# Patient Record
Sex: MALE | Race: WHITE | URBAN - METROPOLITAN AREA
[De-identification: names, ages, dates, MRNs, and addresses within clinical notes are randomized per-mention and may not be internally consistent; named-entity substitution may affect disease eponyms.]

---

## 2018-10-08 ENCOUNTER — HISTORICAL (OUTPATIENT)
Dept: ADMINISTRATIVE | Facility: HOSPITAL | Age: 78
End: 2018-10-08

## 2018-10-08 LAB
ABS NEUT (OLG): 4.82 X10(3)/MCL (ref 2.1–9.2)
BASOPHILS # BLD AUTO: 0 X10(3)/MCL (ref 0–0.2)
BASOPHILS NFR BLD AUTO: 0 %
CRP SERPL HS-MCNC: 0.35 MG/L (ref 0–3)
EOSINOPHIL # BLD AUTO: 0.3 X10(3)/MCL (ref 0–0.9)
EOSINOPHIL NFR BLD AUTO: 4 %
ERYTHROCYTE [DISTWIDTH] IN BLOOD BY AUTOMATED COUNT: 14 % (ref 11.5–17)
ERYTHROCYTE [SEDIMENTATION RATE] IN BLOOD: 8 MM/HR (ref 0–15)
HCT VFR BLD AUTO: 39.7 % (ref 42–52)
HGB BLD-MCNC: 12.7 GM/DL (ref 14–18)
LYMPHOCYTES # BLD AUTO: 1.7 X10(3)/MCL (ref 0.6–4.6)
LYMPHOCYTES NFR BLD AUTO: 22 %
MCH RBC QN AUTO: 29.4 PG (ref 27–31)
MCHC RBC AUTO-ENTMCNC: 32 GM/DL (ref 33–36)
MCV RBC AUTO: 91.9 FL (ref 80–94)
MONOCYTES # BLD AUTO: 0.9 X10(3)/MCL (ref 0.1–1.3)
MONOCYTES NFR BLD AUTO: 12 %
NEUTROPHILS # BLD AUTO: 4.82 X10(3)/MCL (ref 2.1–9.2)
NEUTROPHILS NFR BLD AUTO: 61 %
PLATELET # BLD AUTO: 200 X10(3)/MCL (ref 130–400)
PMV BLD AUTO: 9.9 FL (ref 9.4–12.4)
RBC # BLD AUTO: 4.32 X10(6)/MCL (ref 4.7–6.1)
WBC # SPEC AUTO: 7.8 X10(3)/MCL (ref 4.5–11.5)

## 2018-10-15 ENCOUNTER — HISTORICAL (OUTPATIENT)
Dept: ADMINISTRATIVE | Facility: HOSPITAL | Age: 78
End: 2018-10-15

## 2018-11-13 ENCOUNTER — HISTORICAL (OUTPATIENT)
Dept: ADMINISTRATIVE | Facility: HOSPITAL | Age: 78
End: 2018-11-13

## 2018-11-13 LAB
ABS NEUT (OLG): 6.1 X10(3)/MCL (ref 2.1–9.2)
ALBUMIN SERPL-MCNC: 3.9 GM/DL (ref 3.4–5)
ALBUMIN/GLOB SERPL: 1.3 RATIO (ref 1.1–2)
ALP SERPL-CCNC: 78 UNIT/L (ref 50–136)
ALT SERPL-CCNC: 20 UNIT/L (ref 12–78)
APPEARANCE, UA: CLEAR
APTT PPP: 29.8 SECOND(S) (ref 24.8–36.9)
AST SERPL-CCNC: 20 UNIT/L (ref 15–37)
BACTERIA SPEC CULT: NORMAL /HPF
BASOPHILS # BLD AUTO: 0 X10(3)/MCL (ref 0–0.2)
BASOPHILS NFR BLD AUTO: 0 %
BILIRUB SERPL-MCNC: 0.5 MG/DL (ref 0.2–1)
BILIRUB UR QL STRIP: NEGATIVE
BILIRUBIN DIRECT+TOT PNL SERPL-MCNC: 0.1 MG/DL (ref 0–0.5)
BILIRUBIN DIRECT+TOT PNL SERPL-MCNC: 0.4 MG/DL (ref 0–0.8)
BUN SERPL-MCNC: 10 MG/DL (ref 7–18)
CALCIUM SERPL-MCNC: 9 MG/DL (ref 8.5–10.1)
CHLORIDE SERPL-SCNC: 105 MMOL/L (ref 98–107)
CO2 SERPL-SCNC: 31 MMOL/L (ref 21–32)
COLOR UR: YELLOW
CREAT SERPL-MCNC: 0.78 MG/DL (ref 0.7–1.3)
EOSINOPHIL # BLD AUTO: 0.3 X10(3)/MCL (ref 0–0.9)
EOSINOPHIL NFR BLD AUTO: 3 %
ERYTHROCYTE [DISTWIDTH] IN BLOOD BY AUTOMATED COUNT: 14.2 % (ref 11.5–17)
EST. AVERAGE GLUCOSE BLD GHB EST-MCNC: 126 MG/DL
GLOBULIN SER-MCNC: 2.9 GM/DL (ref 2.4–3.5)
GLUCOSE (UA): NEGATIVE
GLUCOSE SERPL-MCNC: 98 MG/DL (ref 74–106)
HBA1C MFR BLD: 6 % (ref 4.2–6.3)
HCT VFR BLD AUTO: 42.5 % (ref 42–52)
HGB BLD-MCNC: 13.4 GM/DL (ref 14–18)
HGB UR QL STRIP: NEGATIVE
INR PPP: 1.04 (ref 0–1.27)
KETONES UR QL STRIP: NEGATIVE
LEUKOCYTE ESTERASE UR QL STRIP: NEGATIVE
LYMPHOCYTES # BLD AUTO: 2 X10(3)/MCL (ref 0.6–4.6)
LYMPHOCYTES NFR BLD AUTO: 22 %
MCH RBC QN AUTO: 28.8 PG (ref 27–31)
MCHC RBC AUTO-ENTMCNC: 31.5 GM/DL (ref 33–36)
MCV RBC AUTO: 91.2 FL (ref 80–94)
MONOCYTES # BLD AUTO: 1 X10(3)/MCL (ref 0.1–1.3)
MONOCYTES NFR BLD AUTO: 11 %
MRSA SCREEN BY PCR: NEGATIVE
NEUTROPHILS # BLD AUTO: 6.1 X10(3)/MCL (ref 2.1–9.2)
NEUTROPHILS NFR BLD AUTO: 64 %
NITRITE UR QL STRIP: NEGATIVE
PH UR STRIP: 7 [PH] (ref 5–9)
PLATELET # BLD AUTO: 205 X10(3)/MCL (ref 130–400)
PMV BLD AUTO: 10.5 FL (ref 9.4–12.4)
POTASSIUM SERPL-SCNC: 4.4 MMOL/L (ref 3.5–5.1)
PROT SERPL-MCNC: 6.8 GM/DL (ref 6.4–8.2)
PROT UR QL STRIP: NEGATIVE
PROTHROMBIN TIME: 13.9 SECOND(S) (ref 12.2–14.7)
RBC # BLD AUTO: 4.66 X10(6)/MCL (ref 4.7–6.1)
RBC #/AREA URNS HPF: NORMAL /[HPF]
SODIUM SERPL-SCNC: 141 MMOL/L (ref 136–145)
SP GR UR STRIP: 1.02 (ref 1–1.03)
SQUAMOUS EPITHELIAL, UA: NORMAL
TRANSFERRIN SERPL-MCNC: 279 MG/DL (ref 200–360)
UROBILINOGEN UR STRIP-ACNC: 0.2
WBC # SPEC AUTO: 9.5 X10(3)/MCL (ref 4.5–11.5)
WBC #/AREA URNS HPF: NORMAL /HPF

## 2018-11-14 LAB
ABS NEUT (OLG): 13.25 X10(3)/MCL (ref 2.1–9.2)
BASOPHILS # BLD AUTO: 0 X10(3)/MCL (ref 0–0.2)
BASOPHILS NFR BLD AUTO: 0 %
BUN SERPL-MCNC: 12 MG/DL (ref 7–18)
CALCIUM SERPL-MCNC: 7.8 MG/DL (ref 8.5–10.1)
CHLORIDE SERPL-SCNC: 107 MMOL/L (ref 98–107)
CO2 SERPL-SCNC: 27 MMOL/L (ref 21–32)
CREAT SERPL-MCNC: 0.77 MG/DL (ref 0.7–1.3)
CREAT/UREA NIT SERPL: 15.6
ERYTHROCYTE [DISTWIDTH] IN BLOOD BY AUTOMATED COUNT: 14.3 % (ref 11.5–17)
GLUCOSE SERPL-MCNC: 105 MG/DL (ref 74–106)
HCT VFR BLD AUTO: 36.1 % (ref 42–52)
HGB BLD-MCNC: 11.2 GM/DL (ref 14–18)
LYMPHOCYTES # BLD AUTO: 0.9 X10(3)/MCL (ref 0.6–4.6)
LYMPHOCYTES NFR BLD AUTO: 6 %
MCH RBC QN AUTO: 28.9 PG (ref 27–31)
MCHC RBC AUTO-ENTMCNC: 31 GM/DL (ref 33–36)
MCV RBC AUTO: 93 FL (ref 80–94)
MONOCYTES # BLD AUTO: 1.2 X10(3)/MCL (ref 0.1–1.3)
MONOCYTES NFR BLD AUTO: 8 %
NEUTROPHILS # BLD AUTO: 13.25 X10(3)/MCL (ref 2.1–9.2)
NEUTROPHILS NFR BLD AUTO: 86 %
PLATELET # BLD AUTO: 176 X10(3)/MCL (ref 130–400)
PMV BLD AUTO: 10.3 FL (ref 9.4–12.4)
POTASSIUM SERPL-SCNC: 4.5 MMOL/L (ref 3.5–5.1)
RBC # BLD AUTO: 3.88 X10(6)/MCL (ref 4.7–6.1)
SODIUM SERPL-SCNC: 143 MMOL/L (ref 136–145)
WBC # SPEC AUTO: 15.5 X10(3)/MCL (ref 4.5–11.5)

## 2018-11-15 LAB
ABS NEUT (OLG): 6.4 X10(3)/MCL (ref 2.1–9.2)
BASOPHILS # BLD AUTO: 0 X10(3)/MCL (ref 0–0.2)
BASOPHILS NFR BLD AUTO: 0 %
BUN SERPL-MCNC: 14 MG/DL (ref 7–18)
CALCIUM SERPL-MCNC: 7.4 MG/DL (ref 8.5–10.1)
CHLORIDE SERPL-SCNC: 110 MMOL/L (ref 98–107)
CO2 SERPL-SCNC: 27 MMOL/L (ref 21–32)
CREAT SERPL-MCNC: 0.7 MG/DL (ref 0.7–1.3)
CREAT/UREA NIT SERPL: 20
EOSINOPHIL # BLD AUTO: 0.1 X10(3)/MCL (ref 0–0.9)
EOSINOPHIL NFR BLD AUTO: 1 %
ERYTHROCYTE [DISTWIDTH] IN BLOOD BY AUTOMATED COUNT: 14.4 % (ref 11.5–17)
GLUCOSE SERPL-MCNC: 99 MG/DL (ref 74–106)
HCT VFR BLD AUTO: 33.9 % (ref 42–52)
HGB BLD-MCNC: 10.4 GM/DL (ref 14–18)
LYMPHOCYTES # BLD AUTO: 1.4 X10(3)/MCL (ref 0.6–4.6)
LYMPHOCYTES NFR BLD AUTO: 16 %
MCH RBC QN AUTO: 29.1 PG (ref 27–31)
MCHC RBC AUTO-ENTMCNC: 30.7 GM/DL (ref 33–36)
MCV RBC AUTO: 95 FL (ref 80–94)
MONOCYTES # BLD AUTO: 1.2 X10(3)/MCL (ref 0.1–1.3)
MONOCYTES NFR BLD AUTO: 13 %
NEUTROPHILS # BLD AUTO: 6.4 X10(3)/MCL (ref 2.1–9.2)
NEUTROPHILS NFR BLD AUTO: 69 %
PLATELET # BLD AUTO: 147 X10(3)/MCL (ref 130–400)
PMV BLD AUTO: 10.5 FL (ref 9.4–12.4)
POTASSIUM SERPL-SCNC: 4.3 MMOL/L (ref 3.5–5.1)
RBC # BLD AUTO: 3.57 X10(6)/MCL (ref 4.7–6.1)
SODIUM SERPL-SCNC: 143 MMOL/L (ref 136–145)
WBC # SPEC AUTO: 9.3 X10(3)/MCL (ref 4.5–11.5)

## 2018-12-12 ENCOUNTER — HISTORICAL (OUTPATIENT)
Dept: ADMINISTRATIVE | Facility: HOSPITAL | Age: 78
End: 2018-12-12

## 2018-12-12 LAB
ABS NEUT (OLG): 5.41 X10(3)/MCL (ref 2.1–9.2)
ALBUMIN SERPL-MCNC: 3.5 GM/DL (ref 3.4–5)
ALBUMIN/GLOB SERPL: 1 {RATIO}
ALP SERPL-CCNC: 108 UNIT/L (ref 45–117)
ALT SERPL-CCNC: 14 UNIT/L (ref 16–61)
APPEARANCE, UA: CLEAR
APTT PPP: 30.8 SECOND(S) (ref 24.8–36.9)
AST SERPL-CCNC: 13 UNIT/L (ref 15–37)
BILIRUB SERPL-MCNC: 0.3 MG/DL (ref 0.2–1)
BILIRUB UR QL STRIP: NEGATIVE
BILIRUBIN DIRECT+TOT PNL SERPL-MCNC: 0.1 MG/DL (ref 0–0.2)
BILIRUBIN DIRECT+TOT PNL SERPL-MCNC: 0.2 MG/DL (ref 0–1)
BUN SERPL-MCNC: 9 MG/DL (ref 7–18)
CALCIUM SERPL-MCNC: 8.2 MG/DL (ref 8.5–10.1)
CHLORIDE SERPL-SCNC: 105 MMOL/L (ref 98–107)
CO2 SERPL-SCNC: 30 MMOL/L (ref 21–32)
COLOR UR: YELLOW
CREAT SERPL-MCNC: 0.85 MG/DL (ref 0.7–1.3)
ERYTHROCYTE [DISTWIDTH] IN BLOOD BY AUTOMATED COUNT: 13.6 % (ref 11.5–17)
EST. AVERAGE GLUCOSE BLD GHB EST-MCNC: 128 MG/DL
GLOBULIN SER-MCNC: 3 GM/DL (ref 2–4)
GLUCOSE (UA): NEGATIVE
GLUCOSE SERPL-MCNC: 146 MG/DL (ref 74–106)
HBA1C MFR BLD: 6.1 % (ref 4.2–6.3)
HCT VFR BLD AUTO: 37 % (ref 42–52)
HGB BLD-MCNC: 12 GM/DL (ref 14–18)
HGB UR QL STRIP: NEGATIVE
INR PPP: 1.1 (ref 0–1.3)
KETONES UR QL STRIP: NEGATIVE
LEUKOCYTE ESTERASE UR QL STRIP: NEGATIVE
MCH RBC QN AUTO: 29.5 PG (ref 27–31)
MCHC RBC AUTO-ENTMCNC: 32.4 GM/DL (ref 33–36)
MCV RBC AUTO: 90.9 FL (ref 80–94)
NITRITE UR QL STRIP: NEGATIVE
PH UR STRIP: 6.5 [PH] (ref 5–7)
PLATELET # BLD AUTO: 180 X10(3)/MCL (ref 130–400)
PMV BLD AUTO: 10.2 FL (ref 7.4–10.4)
POTASSIUM SERPL-SCNC: 3.8 MMOL/L (ref 3.5–5.1)
PROT SERPL-MCNC: 6.9 GM/DL (ref 6.4–8.2)
PROT UR QL STRIP: NEGATIVE
PROTHROMBIN TIME: 14.7 SECOND(S) (ref 12.2–14.7)
RBC # BLD AUTO: 4.07 X10(6)/MCL (ref 4.7–6.1)
SODIUM SERPL-SCNC: 143 MMOL/L (ref 136–145)
SP GR UR STRIP: 1.02 (ref 1–1.03)
TRANSFERRIN SERPL-MCNC: 232 MG/DL (ref 200–360)
UROBILINOGEN UR STRIP-ACNC: NEGATIVE
WBC # SPEC AUTO: 8 X10(3)/MCL (ref 4.5–11.5)

## 2018-12-14 LAB — FINAL CULTURE: NORMAL

## 2018-12-26 ENCOUNTER — HISTORICAL (OUTPATIENT)
Dept: ADMINISTRATIVE | Facility: HOSPITAL | Age: 78
End: 2018-12-26

## 2019-02-06 ENCOUNTER — HISTORICAL (OUTPATIENT)
Dept: ADMINISTRATIVE | Facility: HOSPITAL | Age: 79
End: 2019-02-06

## 2019-05-08 ENCOUNTER — HISTORICAL (OUTPATIENT)
Dept: ADMINISTRATIVE | Facility: HOSPITAL | Age: 79
End: 2019-05-08

## 2019-05-14 ENCOUNTER — HISTORICAL (OUTPATIENT)
Dept: ADMINISTRATIVE | Facility: HOSPITAL | Age: 79
End: 2019-05-14

## 2019-05-14 LAB
ABS NEUT (OLG): 7.16 X10(3)/MCL (ref 2.1–9.2)
ALBUMIN SERPL-MCNC: 3.5 GM/DL (ref 3.4–5)
ALBUMIN/GLOB SERPL: 1.2 {RATIO}
ALP SERPL-CCNC: 67 UNIT/L (ref 45–117)
ALT SERPL-CCNC: 22 UNIT/L (ref 16–61)
APPEARANCE, UA: CLEAR
APTT PPP: 26.5 SEC (ref 23.4–34.9)
AST SERPL-CCNC: 11 UNIT/L (ref 15–37)
BILIRUB SERPL-MCNC: 0.4 MG/DL (ref 0.2–1)
BILIRUB UR QL STRIP: NEGATIVE
BILIRUBIN DIRECT+TOT PNL SERPL-MCNC: 0.12 MG/DL (ref 0–0.2)
BILIRUBIN DIRECT+TOT PNL SERPL-MCNC: 0.28 MG/DL (ref 0–1)
BUN SERPL-MCNC: 16 MG/DL (ref 7–18)
CALCIUM SERPL-MCNC: 7.7 MG/DL (ref 8.5–10.1)
CHLORIDE SERPL-SCNC: 105 MMOL/L (ref 98–107)
CO2 SERPL-SCNC: 30 MMOL/L (ref 21–32)
COLOR UR: NORMAL
CREAT SERPL-MCNC: 0.88 MG/DL (ref 0.7–1.3)
ERYTHROCYTE [DISTWIDTH] IN BLOOD BY AUTOMATED COUNT: 15.4 % (ref 11.5–17)
EST. AVERAGE GLUCOSE BLD GHB EST-MCNC: 128 MG/DL
GLOBULIN SER-MCNC: 3 GM/DL (ref 2–4)
GLUCOSE (UA): NEGATIVE
GLUCOSE SERPL-MCNC: 82 MG/DL (ref 74–106)
HBA1C MFR BLD: 6.1 % (ref 4.2–6.3)
HCT VFR BLD AUTO: 41.8 % (ref 42–52)
HGB BLD-MCNC: 13.5 GM/DL (ref 14–18)
HGB UR QL STRIP: NEGATIVE
INR PPP: 1.1 (ref 2–3)
KETONES UR QL STRIP: NEGATIVE
LEUKOCYTE ESTERASE UR QL STRIP: NEGATIVE
MCH RBC QN AUTO: 28.6 PG (ref 27–31)
MCHC RBC AUTO-ENTMCNC: 32.3 GM/DL (ref 33–36)
MCV RBC AUTO: 88.6 FL (ref 80–94)
NITRITE UR QL STRIP: NEGATIVE
PH UR STRIP: 7 [PH] (ref 5–7)
PLATELET # BLD AUTO: 223 X10(3)/MCL (ref 130–400)
PMV BLD AUTO: 10.6 FL (ref 7.4–10.4)
POTASSIUM SERPL-SCNC: 3.9 MMOL/L (ref 3.5–5.1)
PROT SERPL-MCNC: 6.3 GM/DL (ref 6.4–8.2)
PROT UR QL STRIP: NEGATIVE
PROTHROMBIN TIME: 13.9 SEC (ref 11.7–14.5)
RBC # BLD AUTO: 4.72 X10(6)/MCL (ref 4.7–6.1)
SODIUM SERPL-SCNC: 142 MMOL/L (ref 136–145)
SP GR UR STRIP: 1.01 (ref 1–1.03)
TRANSFERRIN SERPL-MCNC: 236 MG/DL (ref 200–360)
UROBILINOGEN UR STRIP-ACNC: NEGATIVE
WBC # SPEC AUTO: 11.2 X10(3)/MCL (ref 4.5–11.5)

## 2019-05-16 LAB — FINAL CULTURE: NORMAL

## 2019-05-22 ENCOUNTER — HISTORICAL (OUTPATIENT)
Dept: ADMINISTRATIVE | Facility: HOSPITAL | Age: 79
End: 2019-05-22

## 2019-06-03 LAB
HCT VFR BLD AUTO: 40.8 % (ref 42–52)
HGB BLD-MCNC: 12.8 GM/DL (ref 14–18)

## 2019-06-04 LAB
ABS NEUT (OLG): 11.49 X10(3)/MCL (ref 2.1–9.2)
BUN SERPL-MCNC: 11 MG/DL (ref 7–18)
CALCIUM SERPL-MCNC: 7.8 MG/DL (ref 8.5–10.1)
CHLORIDE SERPL-SCNC: 107 MMOL/L (ref 98–107)
CO2 SERPL-SCNC: 29 MMOL/L (ref 21–32)
CREAT SERPL-MCNC: 0.83 MG/DL (ref 0.7–1.3)
CREAT/UREA NIT SERPL: 13
ERYTHROCYTE [DISTWIDTH] IN BLOOD BY AUTOMATED COUNT: 14.8 % (ref 11.5–17)
GLUCOSE SERPL-MCNC: 131 MG/DL (ref 74–106)
HCT VFR BLD AUTO: 35.2 % (ref 42–52)
HGB BLD-MCNC: 11.2 GM/DL (ref 14–18)
MCH RBC QN AUTO: 29.1 PG (ref 27–31)
MCHC RBC AUTO-ENTMCNC: 31.8 GM/DL (ref 33–36)
MCV RBC AUTO: 91.4 FL (ref 80–94)
PLATELET # BLD AUTO: 176 X10(3)/MCL (ref 130–400)
PMV BLD AUTO: 9.9 FL (ref 7.4–10.4)
POTASSIUM SERPL-SCNC: 4.4 MMOL/L (ref 3.5–5.1)
RBC # BLD AUTO: 3.85 X10(6)/MCL (ref 4.7–6.1)
SODIUM SERPL-SCNC: 141 MMOL/L (ref 136–145)
WBC # SPEC AUTO: 13.8 X10(3)/MCL (ref 4.5–11.5)

## 2019-06-05 LAB
ABS NEUT (OLG): 12.08 X10(3)/MCL (ref 2.1–9.2)
BUN SERPL-MCNC: 13 MG/DL (ref 7–18)
CALCIUM SERPL-MCNC: 8.5 MG/DL (ref 8.5–10.1)
CHLORIDE SERPL-SCNC: 109 MMOL/L (ref 98–107)
CO2 SERPL-SCNC: 30 MMOL/L (ref 21–32)
CREAT SERPL-MCNC: 0.77 MG/DL (ref 0.7–1.3)
CREAT/UREA NIT SERPL: 17
ERYTHROCYTE [DISTWIDTH] IN BLOOD BY AUTOMATED COUNT: 14.6 % (ref 11.5–17)
GLUCOSE SERPL-MCNC: 115 MG/DL (ref 74–106)
HCT VFR BLD AUTO: 30.8 % (ref 42–52)
HGB BLD-MCNC: 9.7 GM/DL (ref 14–18)
MCH RBC QN AUTO: 28.8 PG (ref 27–31)
MCHC RBC AUTO-ENTMCNC: 31.5 GM/DL (ref 33–36)
MCV RBC AUTO: 91.4 FL (ref 80–94)
PLATELET # BLD AUTO: 155 X10(3)/MCL (ref 130–400)
PMV BLD AUTO: 10.1 FL (ref 7.4–10.4)
POTASSIUM SERPL-SCNC: 4.2 MMOL/L (ref 3.5–5.1)
RBC # BLD AUTO: 3.37 X10(6)/MCL (ref 4.7–6.1)
SODIUM SERPL-SCNC: 142 MMOL/L (ref 136–145)
WBC # SPEC AUTO: 14.6 X10(3)/MCL (ref 4.5–11.5)

## 2019-06-18 ENCOUNTER — HISTORICAL (OUTPATIENT)
Dept: ADMINISTRATIVE | Facility: HOSPITAL | Age: 79
End: 2019-06-18

## 2019-08-19 ENCOUNTER — HISTORICAL (OUTPATIENT)
Dept: ADMINISTRATIVE | Facility: HOSPITAL | Age: 79
End: 2019-08-19

## 2019-11-13 ENCOUNTER — HISTORICAL (OUTPATIENT)
Dept: ADMINISTRATIVE | Facility: HOSPITAL | Age: 79
End: 2019-11-13

## 2019-11-24 ENCOUNTER — HISTORICAL (OUTPATIENT)
Dept: ADMINISTRATIVE | Facility: HOSPITAL | Age: 79
End: 2019-11-24

## 2020-02-04 ENCOUNTER — HISTORICAL (OUTPATIENT)
Dept: ADMINISTRATIVE | Facility: HOSPITAL | Age: 80
End: 2020-02-04

## 2020-03-11 ENCOUNTER — HISTORICAL (OUTPATIENT)
Dept: ADMINISTRATIVE | Facility: HOSPITAL | Age: 80
End: 2020-03-11

## 2020-04-01 ENCOUNTER — HISTORICAL (OUTPATIENT)
Dept: ADMINISTRATIVE | Facility: HOSPITAL | Age: 80
End: 2020-04-01

## 2020-04-03 ENCOUNTER — HISTORICAL (OUTPATIENT)
Dept: ADMINISTRATIVE | Facility: HOSPITAL | Age: 80
End: 2020-04-03

## 2022-09-13 NOTE — HISTORICAL [facility]
This is a historical note converted from Christopher. Formatting and pictures may have been removed.  Please reference Christopher for original formatting and attached multimedia. Chief Complaint  12 month s/p Right Rev TSA, SX 11/14/18. ?Pt states his ROM is still not were it need to be, with min pain. ?Pain today is from the cold weather.  History of Present Illness  11/14/2018: Revision total shoulder arthroplasty to reverse shoulder arthroplasty  ?   He returns today. His pains under good control.? Thus far is happy with his results  Review of Systems  Comprehensive review of system?was performed with no exceptions other than noted in the history of present illness [1]  Physical Exam  Vitals & Measurements  BP:?130/70?  HT:?175?cm? WT:?96.8?kg? BMI:?31.61?  Gen: WN, WD, NAD  Card/Res: NL breathing, +distal pulses  Abdomen: ND  Shoulder Exam:??????????Right??????????Left  Skin:??????????????????????????????Normal???????Normal  AC joint tenderness:??????????None??????????None  Forward Flexion:?????????????160 ??????????160  Abduction:?????????????????????110??????????? 180  External Rotation:??????????????40??????????????80  Internal Rotation?????????????? 80 ???????????? 80  ?   N-V status:?????????????????????????Intact??????????Intact  ?   C-spine: Normal ROM, NT  ?  DASH:  2/6/2019: 50  11/13/2019: 19  Assessment/Plan  1.?S/p reverse total shoulder arthroplasty?Z96.619  Doing well status post above. ?I will see him back next year with radiographs the right shoulder  Ordered:  Clinic Follow up, *Est. 11/13/20 3:00:00 CST, Order for future visit, S/p reverse total shoulder arthroplasty, [facility]  Office/Outpatient Visit Level 3 Established [id number] PC, S/p reverse total shoulder arthroplasty, [facility], 11/13/19 11:01:00 CST  ?  Orders:  XR Shoulder Right Minimum 2 Views, Routine, 11/13/19 10:22:00 CST, Pain, None, Ambulatory, Patient Has IV?, Rad Type, Right shoulder pain, Not Scheduled, 11/13/19 10:22:00  CST  Referrals  Clinic Follow up, *Est. 11/13/20 3:00:00 CST, Order for future visit, S/p reverse total shoulder arthroplasty, [facility]   Problem List/Past Medical History  Ongoing  Acute myocardial infarction  Asthma  CAD - Coronary artery disease  Diabetes mellitus  Failed total joint replacement  Glasses  H/O shoulder replacement  Hyperlipidemia  Hypertension  Obesity  Rotator cuff tear  S/p reverse total shoulder arthroplasty  Historical  No qualifying data  Procedure/Surgical History  FLAVIA CROFT Total Knee Arthroplasty (Right) (06/03/2019)  Replacement of Right Knee Joint with Synthetic Substitute, Open Approach (06/03/2019)  Robotic Assisted Procedure of Lower Extremity, Open Approach (06/03/2019)  Removal of Synthetic Substitute from Right Shoulder Joint, Open Approach (11/13/2018)  Replacement of Right Shoulder Joint with Reverse Ball and Socket Synthetic Substitute, Open Approach (11/13/2018)  Total Reverse Shoulder (Right) (11/13/2018)  Stent placement (2017)  Appendectomy  Shoulder replacement   Medications  aspirin 325 mg oral Delayed Release (EC) tablet, 325 mg= 1 tab(s), Oral, Daily  ATORVASTATIN 80 MG TABLET, 1 tab, Oral, At Bedtime  CeleBREX, 200 mg= 1 cap(s), Oral, Once  Colace 100 mg oral capsule, 200 mg= 2 cap(s), Oral, Daily  gabapentin 300 mg oral capsule, 300 mg= 1 cap(s), Oral, Once  LATANOPROST 0 005 SOLN, 1 drop, Daily  LISINOPRIL 20 MG TAB, 1 tab, Oral, Daily  METFORMIN 500MG TAB, 1 tab, Oral, BID  NITROGLYCERIN 0 4 MG TAB, 1 tab, SL, q5min, PRN  omeprazole, 20 mg, Oral, Daily, PRN,? ?Still taking, not as prescribed: Takes only prn- OTC Last Dose Date/Time Unknown  Tylenol, 500 mg= 1 tab(s), Oral, q4hr,? ?Not taking: Last Dose Date/Time Unknown  Tylenol, 1000 mg= 2 tab(s), Oral, Once  VENTOLIN HFA 90 MCG INHALER, 2 puff, INH, BID,? ?Not taking: Last Dose Date/Time Unknown Last Dose Date/Time Unknown  Zofran ODT 4 mg oral tablet, disintegrating, 4 mg= 1 tab(s), Oral, q6hr, PRN, 1  refills  Allergies  No Known Medication Allergies  Social History  Abuse/Neglect  No, 11/13/2019  Alcohol  Never, 10/08/2018  Employment/School  Retired, 05/14/2019  Exercise  Home/Environment  Lives with Spouse., 10/08/2018  Nutrition/Health  Regular, 12/12/2018  Tobacco  Former smoker, quit more than 30 days ago, No, 11/13/2019  Family History  Asthma.: Sister.  Health Maintenance  Health Maintenance  ???Pending?(in the next year)  ??? ??OverDue  ??? ? ? ?Coronary Artery Disease Maintenance-Antiplatelet Agent Prescribed due??and every?  ??? ? ? ?Coronary Artery Disease Maintenance-Lipid Lowering Therapy due??and every?  ??? ? ? ?Advance Directive due??01/01/19??and every 1??year(s)  ??? ? ? ?Geriatric Depression Screening due??01/01/19??and every 1??year(s)  ??? ??Due?  ??? ? ? ?ADL Screening due??11/13/19??and every 1??year(s)  ??? ? ? ?Asthma Management-Asthma Education due??11/13/19??and every 6??month(s)  ??? ? ? ?Asthma Management-Ornelas Peak Flow due??11/13/19??Variable frequency  ??? ? ? ?Asthma Management-Written Action Plan due??11/13/19??and every 6??month(s)  ??? ? ? ?Diabetes Maintenance-Eye Exam due??11/13/19??and every?  ??? ? ? ?Diabetes Maintenance-Foot Exam due??11/13/19??and every?  ??? ? ? ?Diabetes Maintenance-Fasting Lipid Profile due??11/13/19??and every?  ??? ? ? ?Hypertension Management-Education due??11/13/19??and every 1??year(s)  ??? ? ? ?Pneumococcal Vaccine due??11/13/19??Variable frequency  ??? ? ? ?Pneumococcal Vaccine due??11/13/19??and every?  ??? ? ? ?Tetanus Vaccine due??11/13/19??and every 10??year(s)  ??? ? ? ?Zoster Vaccine due??11/13/19??and every 100??year(s)  ??? ??Refused?  ??? ? ? ?Cognitive Screening due??01/01/19??and every 1??year(s)  ??? ??Due In Future?  ??? ? ? ?Fall Risk Assessment not due until??01/01/20??and every 1??year(s)  ??? ? ? ?Functional Assessment not due until??01/01/20??and every 1??year(s)  ??? ? ? ?Obesity Screening not due until??01/01/20??and every  1??year(s)  ??? ? ? ?Diabetes Maintenance-HgbA1c not due until??05/13/20??and every 1??year(s)  ??? ? ? ?Diabetes Maintenance-Urine Dipstick not due until??05/14/20??and every 1??year(s)  ??? ? ? ?Hypertension Management-BMP not due until??06/04/20??and every 1??year(s)  ??? ? ? ?Coronary Artery Disease Maintenance-BMP not due until??06/05/20??and every 1??year(s)  ??? ? ? ?Diabetes Maintenance-Serum Creatinine not due until??06/05/20??and every 1??year(s)  ??? ? ? ?Aspirin Therapy for CVD Prevention not due until??06/06/20??and every 1??year(s)  ??? ? ? ?Asthma Management-Spirometry not due until??06/06/20??and every 1??year(s)  ??? ? ? ?Coronary Artery Disease Maintenance-Electrocardiogram not due until??06/06/20??and every 1??year(s)  ??? ? ? ?Hypertension Management-Blood Pressure not due until??07/17/20??and every 1??year(s)  ???Satisfied?(in the past 1 year)  ??? ??Satisfied?  ??? ? ? ?Aspirin Therapy for CVD Prevention on??06/06/19.??Satisfied by Paz Moya NP  ??? ? ? ?Asthma Management-Asthma Medication Prescribed on??06/04/19.??Satisfied by Paz Moya NP  ??? ? ? ?Asthma Management-Spirometry on??06/06/19.??Satisfied by Gisel Mccall RN  ??? ? ? ?Blood Pressure Screening on??11/13/19.??Satisfied by Brooklyn Estes  ??? ? ? ?Body Mass Index Check on??11/13/19.??Satisfied by Brooklyn Estes  ??? ? ? ?Coronary Artery Disease Maintenance-Electrocardiogram on??06/06/19.  ??? ? ? ?Coronary Artery Disease Maintenance-Antiplatelet Agent Prescribed on??06/06/19.??Satisfied by Nita PARKER, Paz SÁNCHEZ  ??? ? ? ?Coronary Artery Disease Maintenance-Lipid Lowering Therapy on??06/05/19.??Satisfied by Dayo ADAMS, Yung RAMOS  ??? ? ? ?Coronary Artery Disease Maintenance-BMP on??06/05/19.??Satisfied by Gal Izaguirre  ??? ? ? ?Depression Screening on??05/08/19.??Satisfied by Tonja Barnes  ??? ? ? ?Diabetes Maintenance-HgbA1c on??05/14/19.??Satisfied by Tea Amador  ??? ? ? ?Diabetes  Maintenance-Serum Creatinine on??06/05/19.??Satisfied by Gal Izaguirre  ??? ? ? ?Diabetes Maintenance-Urine Dipstick on??05/14/19.??Satisfied by Gal Izaguirre  ??? ? ? ?Diabetes Screening on??06/05/19.??Satisfied by Gal Izaguirre  ??? ? ? ?Fall Risk Assessment on??06/06/19.??Satisfied by Gisel Mccall RN  ??? ? ? ?Functional Assessment on??06/18/19.??Satisfied by Sobeida Hoffmann LPN  ??? ? ? ?Hypertension Management-BMP on??06/05/19.??Satisfied by Gal Izaguirre  ??? ? ? ?Hypertension Management-Blood Pressure on??11/13/19.??Satisfied by Brooklyn Estes  ??? ? ? ?Influenza Vaccine on??02/06/19.??Satisfied by Tonja Barnes  ??? ? ? ?Obesity Screening on??11/13/19.??Satisfied by Brooklyn Estes  ??? ? ? ?Smoking Cessation (Coronary Artery Disease) on??11/15/18.??Satisfied by Guy Barone RN  ??? ? ? ?Smoking Cessation (Diabetes) on??11/15/18.??Satisfied by Guy Barone RN  ??? ??Refused?  ??? ? ? ?Cognitive Screening on??05/14/19.??Recorded by Luis A Whittington  ?  Diagnostic Results  Shoulder radiographs show appropriate implant position     [1]?Office Visit Note; Aleida LARIOS MD, Srinivas MOSHER 05/08/2019 10:35 CDT

## 2022-09-13 NOTE — HISTORICAL [facility]
This is a historical note converted from Crhistopher. Formatting and pictures may have been removed.  Please reference Christopher for original formatting and attached multimedia. Chief Complaint  3 month flu Rt rev TSA here for eval and DASH. Xrays today. Doing good. sx 11/14/18 gl. 02/12/19.  History of Present Illness  11/14/2018: Revision total shoulder arthroplasty to reverse shoulder arthroplasty  ?   He returns today. He is having some pain around the shoulder. ?He is been work on range of motion.? He has had a recent fall where he landed right on the shoulder itself.  Review of Systems  Comprehensive review of system?was performed with no exceptions other than noted in the history of present illness  Physical Exam  Vitals & Measurements  BP:?157/85?  HT:?172.72?cm? WT:?93.5?kg? BMI:?31.34?  Gen: WN, WD, NAD  Card/Res: NL breathing, +distal pulses  Abdomen: ND  Shoulder Exam:??????????Right??????????Left  Skin:??????????????????????????????Normal???????Normal  AC joint tenderness:??????????None??????????None  Forward Flexion:?????????????140 ??????????180  Abduction:?????????????????????100??????????? 180  External Rotation:?????????????? 20??????????????80  Internal Rotation?????????????? 80 ???????????? 80  ?   N-V status:?????????????????????????Intact??????????Intact  ?   C-spine: Normal ROM, NT  ?  ?  DASH:  2/6/2019: 50  Assessment/Plan  S/p reverse total shoulder arthroplasty?Z96.619  ? Improving status post above. ?I will see him back in 3 months with radiographs of the shoulder  Ordered:  Clinic Follow up, *Est. 05/06/19 3:00:00 CDT, Order for future visit, S/p reverse total shoulder arthroplasty, [facility]  Office/Outpatient Visit Level 3 Established [id number] PC, [facility], 02/06/19 11:06:00 CST  XR Shoulder Right Minimum 2 Views, Routine, 02/06/19 10:50:00 CST, Other (please specify), None, Ambulatory, Patient Has IV?, Rad Type, S/p reverse total  shoulder arthroplasty, Not Scheduled, 02/06/19 10:50:00 CST  ?   Problem List/Past Medical History  Ongoing  Acute myocardial infarction  Asthma  CAD - Coronary artery disease  Diabetes mellitus  Failed total joint replacement  Glasses  H/O shoulder replacement  Hyperlipidemia  Hypertension  Obesity  Rotator cuff tear  Historical  No qualifying data  Procedure/Surgical History  Total Reverse Shoulder (Right) (11/13/2018)  Stent placement (2017)  Appendectomy  Shoulder replacement   Medications  AMLODIPINE 5MG TAB,? ?Not taking  aspirin 325 mg oral Delayed Release (EC) tablet, 325 mg= 1 tab(s), Oral, Daily  ATORVASTATIN 80 MG TABLET, 1 tab, Oral, At Bedtime  CeleBREX, 200 mg= 1 cap(s), Oral, Once  CLOPIDOGREL 75 MG TABLET, 75 mg= 1 tab(s), Oral, Daily  gabapentin 300 mg oral capsule, 300 mg= 1 cap(s), Oral, Once  ISOSORBIDE MN ER 30MG TAB, 1 tab, Oral, Daily,? ?Not taking  LATANOPROST 0 005 SOLN, 1 drop, Daily  LISINOPRIL 20 MG TAB, 1 tab, Oral, Daily  METFORMIN 500MG TAB, 1 tab, Oral, BID  METOPROLOL TART 25MG TAB, 1 tab, Oral, At Bedtime  NITROGLYCERIN 0 4 MG TAB, 1 tab, SL, q5min  omeprazole, 20 mg, Oral, Daily  OXYCOD APAP 5 325MG TAB, 1 tab, Oral, q4hr, PRN,? ?Not taking: PRN  Percocet 5/325 oral tablet, 1 tab(s), Oral, q6hr, PRN,? ?Not taking  TAMSULOSIN HCL 0.4 MG CAPSULE,? ?Not taking  TRAVATAN Z 0 004 SOLN, 1 drop, Daily,? ?Not taking  Tylenol, 1000 mg= 2 tab(s), Oral, Once  VENTOLIN HFA 90 MCG INHALER, 2 puff, INH, BID  Allergies  No Known Medication Allergies  Social History  Alcohol  Never, 10/08/2018  Employment/School  Employed, 10/08/2018  Exercise  Home/Environment  Lives with Spouse., 10/08/2018  Nutrition/Health  Regular, 12/12/2018  Tobacco  Former smoker, quit more than 30 days ago, No, 02/06/2019  Former smoker, No, 12/26/2018  Former smoker, No, 12/17/2018  Former smoker, No, 12/10/2018  Former smoker, No, 11/28/2018  Family History  Asthma.: Sister.  Health Maintenance  Health  Maintenance  ???Pending?(in the next year)  ??? ??OverDue  ??? ? ? ?Coronary Artery Disease Maintenance-Antiplatelet Agent Prescribed due??and every?  ??? ? ? ?Coronary Artery Disease Maintenance-Lipid Lowering Therapy due??and every?  ??? ??Due?  ??? ? ? ?ADL Screening due??02/06/19??and every 1??year(s)  ??? ? ? ?Advance Directive due??02/06/19??and every 1??year(s)  ??? ? ? ?Asthma Management-Asthma Education due??02/06/19??and every 6??month(s)  ??? ? ? ?Asthma Management-[place]nel[place] Peak Flow due??02/06/19??Variable frequency  ??? ? ? ?Asthma Management-Written Action Plan due??02/06/19??and every 6??month(s)  ??? ? ? ?Cognitive Screening due??02/06/19??and every 1??year(s)  ??? ? ? ?Diabetes Maintenance-Eye Exam due??02/06/19??and every?  ??? ? ? ?Diabetes Maintenance-Fasting Lipid Profile due??02/06/19??and every?  ??? ? ? ?Diabetes Maintenance-Foot Exam due??02/06/19??and every?  ??? ? ? ?Diabetes Maintenance-Microalbumin due??02/06/19??Variable frequency  ??? ? ? ?Functional Assessment due??02/06/19??and every 1??year(s)  ??? ? ? ?Geriatric Depression Screening due??02/06/19??and every 1??year(s)  ??? ? ? ?Hypertension Management-Education due??02/06/19??and every 1??year(s)  ??? ? ? ?Pneumococcal Vaccine due??02/06/19??Variable frequency  ??? ? ? ?Pneumococcal Vaccine due??02/06/19??and every?  ??? ? ? ?Tetanus Vaccine due??02/06/19??and every 10??year(s)  ??? ? ? ?Zoster Vaccine due??02/06/19??and every 100??year(s)  ??? ??Due In Future?  ??? ? ? ?Smoking Cessation not due until??05/14/19??and every 180??day(s)  ??? ? ? ?Asthma Management-Spirometry not due until??11/14/19??and every 1??year(s)  ??? ? ? ?Aspirin Therapy for CVD Prevention not due until??11/15/19??and every 1??year(s)  ??? ? ? ?Diabetes Maintenance-HgbA1c not due until??12/12/19??and every 1??year(s)  ??? ? ? ?Hypertension Management-BMP not due until??12/12/19??and every 1??year(s)  ??? ? ? ?Coronary Artery Disease Maintenance-BMP not due  until??12/12/19??and every 1??year(s)  ??? ? ? ?Diabetes Maintenance-Serum Creatinine not due until??12/12/19??and every 1??year(s)  ??? ? ? ?Diabetes Maintenance-Urine Dipstick not due until??12/12/19??and every 1??year(s)  ??? ? ? ?Coronary Artery Disease Maintenance-Electrocardiogram not due until??12/17/19??and every 1??year(s)  ??? ? ? ?Hypertension Management-Blood Pressure not due until??12/26/19??and every 1??year(s)  ???Satisfied?(in the past 1 year)  ??? ??Satisfied?  ??? ? ? ?Aspirin Therapy for CVD Prevention on??11/15/18.??Satisfied by Guy Barone RN  ??? ? ? ?Asthma Management-Asthma Medication Prescribed on??12/17/18.??Satisfied by Perry Doherty MD  ??? ? ? ?Asthma Management-Spirometry on??11/14/18.??Satisfied by Lizbeth Espinoza RN  ??? ? ? ?Blood Pressure Screening on??02/06/19.??Satisfied by Tonja Barnes L. L.  ??? ? ? ?Body Mass Index Check on??02/06/19.??Satisfied by Tonja Barnes L. L.  ??? ? ? ?Coronary Artery Disease Maintenance-Electrocardiogram on??12/17/18.??Satisfied by Tiera Murillo RN  ??? ? ? ?Coronary Artery Disease Maintenance-BMP on??12/12/18.??Satisfied by Gal Izaguirre  ??? ? ? ?Coronary Artery Disease Maintenance-Lipid Lowering Therapy on??11/14/18.??Satisfied by Lizbeth Espinoza RN  ??? ? ? ?Coronary Artery Disease Maintenance-Antiplatelet Agent Prescribed on??11/13/18.  ??? ? ? ?Depression Screening on??02/06/19.??Satisfied by Tonja Barnes L. LXiomara  ??? ? ? ?Diabetes Maintenance-HgbA1c on??12/12/18.??Satisfied by Gal Izaguirre  ??? ? ? ?Diabetes Maintenance-Serum Creatinine on??12/12/18.??Satisfied by Gal Izaguirre  ??? ? ? ?Diabetes Maintenance-Urine Dipstick on??12/12/18.??Satisfied by Carlin Hung  ??? ? ? ?Diabetes Screening on??12/12/18.??Satisfied by Gal Izaguirre  ??? ? ? ?Fall Risk Assessment on??02/06/19.??Satisfied by Tonja Barnes  ??? ? ? ?Hypertension Management-BMP on??12/12/18.??Satisfied by Gal Izaguirre  ??? ? ? ?Hypertension Management-Blood  Pressure on??02/06/19.??Satisfied by Tonja Barnes  ??? ? ? ?Influenza Vaccine on??02/06/19.??Satisfied by Tonja Barnes  ??? ? ? ?Obesity Screening on??02/06/19.??Satisfied by Tonja Barnes  ??? ? ? ?Smoking Cessation on??11/15/18.??Satisfied by Guy Barone RN  ??? ? ? ?Smoking Cessation (Coronary Artery Disease) on??11/15/18.??Satisfied by Guy Barone RN  ??? ? ? ?Smoking Cessation (Diabetes) on??11/15/18.??Satisfied by Guy Barone RN  ?  ?  Diagnostic Results  Shoulder radiographs 2/6/2019: 4 views of the shoulder show a implant reduced. ?He is a little anterior?position of his glenoid which looks to be prior. ?I see no new fractures.     [1]?Office Visit Note; Aleida LARIOS MD, Srinivas MOSHER 12/26/2018 11:18 CST

## 2022-09-13 NOTE — HISTORICAL [facility]
This is a historical note converted from Christopher. Formatting and pictures may have been removed.  Please reference Christopher for original formatting and attached multimedia. Chief Complaint  6 week s/p Rt rev TSA here for eval and xrays . Doing good. sx 11/14/18 gl. 02/12/19.  History of Present Illness  11/14/2018: Revision total shoulder arthroplasty to reverse shoulder arthroplasty  ?   He returns today. ?His pains under good control. ?He is working with therapy. ?His total knee arthroplasty was canceled due to?EKG changes  Physical Exam  Vitals & Measurements  BP:?157/85?  HT:?172.72?cm? HT:?172.72?cm? HT:?172.72?cm? WT:?93.5?kg? WT:?93.5?kg? WT:?93.5?kg? BMI:?31.34?  His incisions well-healed. ?He has forward flexion 90 degrees, abduction 80 degrees, external rotation 20 degrees  Assessment/Plan  H/O shoulder replacement?Z96.619  Doing well status post above. ?I will see him in 6 weeks with radiographs of the right shoulder and a D BHARATI score  Ordered:  Clinic Follow up, *Est. 02/06/19 3:00:00 CST, Order for future visit, H/O shoulder replacement, [facility]  Post-Op follow-up visit [id number] PC, H/O shoulder replacement, [facility], 12/26/18 11:18:00 CST  XR Shoulder Right Minimum 2 Views, Routine, 12/26/18 10:48:00 CST, Other (please specify), None, Ambulatory, Patient Has IV?, Rad Type, H/O shoulder replacement, Not Scheduled, 12/26/18 10:48:00 CST  ?   Problem List/Past Medical History  Ongoing  Acute myocardial infarction  Asthma  CAD - Coronary artery disease  Diabetes mellitus  Failed total joint replacement  Glasses  H/O shoulder replacement  Hyperlipidemia  Hypertension  Obesity  Rotator cuff tear  Historical  No qualifying data  Procedure/Surgical History  Total Reverse Shoulder (Right) (11/13/2018)  Stent placement (2017)  Appendectomy  Shoulder replacement   Medications  AMLODIPINE 5MG TAB,? ?Not taking  aspirin 325 mg oral Delayed Release (EC) tablet, 325 mg= 1 tab(s), Oral, Daily  ATORVASTATIN 80  MG TABLET, 1 tab, Oral, At Bedtime  CeleBREX, 200 mg= 1 cap(s), Oral, Once  CLOPIDOGREL 75 MG TABLET, 75 mg= 1 tab(s), Oral, Daily  gabapentin 300 mg oral capsule, 300 mg= 1 cap(s), Oral, Once  ISOSORBIDE MN ER 30MG TAB, 1 tab, Oral, Daily,? ?Not taking  LATANOPROST 0 005 SOLN, 1 drop, Daily  LISINOPRIL 20 MG TAB, 1 tab, Oral, Daily  METFORMIN 500MG TAB, 1 tab, Oral, BID  METOPROLOL TART 25MG TAB, 1 tab, Oral, At Bedtime  NITROGLYCERIN 0 4 MG TAB, 1 tab, SL, q5min  omeprazole, 20 mg, Oral, Daily  OXYCOD APAP 5 325MG TAB, 1 tab, Oral, q4hr, PRN  Percocet 5/325 oral tablet, 1 tab(s), Oral, q6hr, PRN  TAMSULOSIN HCL 0.4 MG CAPSULE,? ?Not taking  TRAVATAN Z 0 004 SOLN, 1 drop, Daily,? ?Not taking  Tylenol, 1000 mg= 2 tab(s), Oral, Once  VENTOLIN HFA 90 MCG INHALER, 2 puff, INH, BID  Allergies  No Known Medication Allergies  Social History  Alcohol  Never, 10/08/2018  Employment/School  Employed, 10/08/2018  Exercise  Home/Environment  Lives with Spouse., 10/08/2018  Nutrition/Health  Regular, 12/12/2018  Tobacco  Former smoker, No, 12/26/2018  Former smoker, No, 12/17/2018  Former smoker, No, 12/10/2018  Former smoker, No, 11/28/2018  Family History  Asthma.: Sister.  Health Maintenance  Health Maintenance  ???Pending?(in the next year)  ??? ??OverDue  ??? ? ? ?Coronary Artery Disease Maintenance-Antiplatelet Agent Prescribed due??and every?  ??? ? ? ?Coronary Artery Disease Maintenance-Lipid Lowering Therapy due??and every?  ??? ??Due?  ??? ? ? ?ADL Screening due??12/26/18??and every 1??year(s)  ??? ? ? ?Advance Directive due??12/26/18??and every 1??year(s)  ??? ? ? ?Asthma Management-Asthma Education due??12/26/18??and every 6??month(s)  ??? ? ? ?Asthma Management-[place]nel[place] Peak Flow due??12/26/18??Variable frequency  ??? ? ? ?Asthma Management-Written Action Plan due??12/26/18??and every 6??month(s)  ??? ? ? ?Cognitive Screening due??12/26/18??and every 1??year(s)  ??? ? ? ?Diabetes Maintenance-Eye Exam  due??12/26/18??and every?  ??? ? ? ?Diabetes Maintenance-Fasting Lipid Profile due??12/26/18??and every?  ??? ? ? ?Diabetes Maintenance-Foot Exam due??12/26/18??and every?  ??? ? ? ?Diabetes Maintenance-Microalbumin due??12/26/18??Variable frequency  ??? ? ? ?Functional Assessment due??12/26/18??and every 1??year(s)  ??? ? ? ?Geriatric Depression Screening due??12/26/18??and every 1??year(s)  ??? ? ? ?Hypertension Management-Education due??12/26/18??and every 1??year(s)  ??? ? ? ?Pneumococcal Vaccine due??12/26/18??Variable frequency  ??? ? ? ?Pneumococcal Vaccine due??12/26/18??and every?  ??? ? ? ?Tetanus Vaccine due??12/26/18??and every 10??year(s)  ??? ? ? ?Zoster Vaccine due??12/26/18??and every 100??year(s)  ??? ??Due In Future?  ??? ? ? ?Smoking Cessation not due until??05/14/19??and every 180??day(s)  ??? ? ? ?Asthma Management-Spirometry not due until??11/14/19??and every 1??year(s)  ??? ? ? ?Aspirin Therapy for CVD Prevention not due until??11/15/19??and every 1??year(s)  ??? ? ? ?Diabetes Maintenance-HgbA1c not due until??12/12/19??and every 1??year(s)  ??? ? ? ?Hypertension Management-BMP not due until??12/12/19??and every 1??year(s)  ??? ? ? ?Coronary Artery Disease Maintenance-BMP not due until??12/12/19??and every 1??year(s)  ??? ? ? ?Diabetes Maintenance-Serum Creatinine not due until??12/12/19??and every 1??year(s)  ??? ? ? ?Diabetes Maintenance-Urine Dipstick not due until??12/12/19??and every 1??year(s)  ??? ? ? ?Coronary Artery Disease Maintenance-Electrocardiogram not due until??12/17/19??and every 1??year(s)  ???Satisfied?(in the past 1 year)  ??? ??Satisfied?  ??? ? ? ?Aspirin Therapy for CVD Prevention on??11/15/18.??Satisfied by Abisai ADAMS, Guy  ??? ? ? ?Asthma Management-Asthma Medication Prescribed on??12/17/18.??Satisfied by Perry Doherty MD  ??? ? ? ?Asthma Management-Spirometry on??11/14/18.??Satisfied by Alexis ADAMS, Lizbeth  ??? ? ? ?Blood Pressure Screening  on??12/26/18.??Satisfied by Tonja Barnes L. L.  ??? ? ? ?Body Mass Index Check on??12/26/18.??Satisfied by Tonja Barnes L. L.  ??? ? ? ?Coronary Artery Disease Maintenance-Electrocardiogram on??12/17/18.??Satisfied by Tiera Murillo RN  ??? ? ? ?Coronary Artery Disease Maintenance-BMP on??12/12/18.??Satisfied by Gal Izaguirre  ??? ? ? ?Coronary Artery Disease Maintenance-Lipid Lowering Therapy on??11/14/18.??Satisfied by Yehuda Espinoza RNid  ??? ? ? ?Coronary Artery Disease Maintenance-Antiplatelet Agent Prescribed on??11/13/18.  ??? ? ? ?Depression Screening on??12/26/18.??Satisfied by Tonja Barnes L. L.  ??? ? ? ?Diabetes Maintenance-HgbA1c on??12/12/18.??Satisfied by Gal Izaguirre  ??? ? ? ?Diabetes Maintenance-Serum Creatinine on??12/12/18.??Satisfied by Gal Izaguirre  ??? ? ? ?Diabetes Maintenance-Urine Dipstick on??12/12/18.??Satisfied by Carlin Hung  ??? ? ? ?Diabetes Screening on??12/12/18.??Satisfied by Gal Izaguirre  ??? ? ? ?Fall Risk Assessment on??12/26/18.??Satisfied by Tonja Barnes L. L.  ??? ? ? ?Hypertension Management-BMP on??12/12/18.??Satisfied by Gal Izaguirre  ??? ? ? ?Hypertension Management-Blood Pressure on??12/26/18.??Satisfied by Tonja Barnes L. L.  ??? ? ? ?Influenza Vaccine on??12/11/18.??Satisfied by Miriam Guerin  ??? ? ? ?Obesity Screening on??12/26/18.??Satisfied by Cameron Tonja L. L.  ??? ? ? ?Smoking Cessation on??11/15/18.??Satisfied by Guy Barone RN  ??? ? ? ?Smoking Cessation (Coronary Artery Disease) on??11/15/18.??Satisfied by Guy Barone RN  ??? ? ? ?Smoking Cessation (Diabetes) on??11/15/18.??Satisfied by Guy Barone RN  ?  ?  Diagnostic Results  Shoulder radiographs 12/26/2018: 4 views of the shoulder show reduced implant.     [1]?Office Visit Note; Aleida LARIOS MD, Srinivas MOSHER 12/10/2018 14:28 CST

## 2022-09-13 NOTE — HISTORICAL [facility]
This is a historical note converted from Christopher. Formatting and pictures may have been removed.  Please reference Christopher for original formatting and attached multimedia. History of Present Illness  He is a pleasant 78-year-old male?who is status post?left?total shoulder arthroplasty in 2011 and right total shoulder arthroplasty and 2015 or 2016. ?He is noticed some pain over his left shoulder particularly with abduction which bother him over the last?year or so. ?He is noticed a decrease in function of his right shoulder since the procedure.? Specific on the right shoulder he has trouble with overhead lifting. ?He is noticed some weakness in the shoulder as well. [1]  Review of Systems  Comprehensive review of system?was performed with no exceptions other than noted in the history of present illness [2]  Physical Exam  Vitals & Measurements  T:?36.8? ?C (Oral)? HR:?62(Peripheral)? BP:?170/90? SpO2:?95%? WT:?95.2?kg?  Gen: WN, WD, NAD  Card/Res: NL breathing, +distal pulses  Abdomen: ND  Shoulder Exam:??????????Right??????????Left  Skin:??????????????????????????????Normal???????Normal  AC joint tenderness:??????????None??????????None  Forward Flexion:?????????????90 ??????????160  Abduction:?????????????????????50??????????? 100  External Rotation:??????????????30?????????????60  Internal Rotation?????????????? 80 ???????????? 80  Supraspinatus stress test???????++??????????Neg  Franco Impingement:?? ?????Neg???????????+  Neer Impingement:?????????????Neg??????????? +  Apprehension:???????????????????? Neg??????????Neg  OBriens:????????????????????????????Neg????????? Neg  Speeds test:??????????????????????? Neg??????????Neg  Strength:  External Rotation:???????????????4/5???????????????5/5  Lift Off/belly press:????????????4/5???????????????5/5  ?   N-V status:?????????????????????????Intact??????????Intact  ?   C-spine: Normal ROM, NT [3]  Assessment/Plan  1.?Failed total joint replacement?T84.019A  ? Revision  of?right?TSA to RTSA  Orders:  ceFAZolin, 2 gm, form: Infusion, IV Piggyback, On Call, Infuse over: 1 hr, Order duration: 1 dose(s), first dose 11/13/18 9:54:00 CST  Below the Knee Graduated Compression Stocking, 11/13/18 7:02:00 CST, Constant Order  Below the Knee Intermittent Pneumatic Compression Device, 11/13/18 7:02:00 CST, Constant Order  MD to Nurse, 11/13/18 7:00:00 CST, Antiseptic nasal swab on day of surgery  NPO, 11/13/18 6:48:00 CST, CM NPO  Urinary Catheter Insertion, 11/13/18 7:01:00 CST, Indwelling, Yes, in OR   Problem List/Past Medical History  Ongoing  Acute myocardial infarction  Failed total joint replacement  Glasses  H/O shoulder replacement  Hyperlipidemia  Hypertension  Obesity  Rotator cuff tear  Historical  No qualifying data  Procedure/Surgical History  Appendectomy  Shoulder replacement  Stent placement   Medications  Inpatient  Ancef, 2 gm= 50 mL, IV Piggyback, On Call  fentaNYL, 50 mcg= 1 mL, IV Push, Once  midazolam, 2 mg= 2 mL, IV Push, q5min, PRN  Naropin 0.5% injectable solution, 200 mg= 40 mL, MISC, Once  Plasmalyte 1,000 mL, 1000 mL, IV  Zofran, 4 mg= 2 mL, IV, Once, PRN  Home  AMLODIPINE 5MG TAB,? ?Not taking  aspirin 325 mg oral Delayed Release (EC) tablet, 325 mg= 1 tab(s), Oral, Daily  ATORVASTATIN 80 MG TABLET, 1 tab, Oral, At Bedtime  CLOPIDOGREL 75 MG TABLET, 75 mg= 1 tab(s), Oral, Daily  ISOSORBIDE MN ER 30MG TAB, 1 tab, Oral, Daily  LATANOPROST 0 005 SOLN, 1 drop, Daily  LISINOPRIL 20 MG TAB, 1 tab, Oral, Daily  METFORMIN 500MG TAB, 1 tab, Oral, BID  METOPROLOL TART 25MG TAB, 1 tab, Oral, Daily  NITROGLYCERIN 0 4 MG TAB  omeprazole, Oral, Daily,? ?Not taking  TRAVATAN Z 0 004 SOLN, 1 drop, Daily,? ?Not taking  VENTOLIN HFA 90 MCG INHALER  Allergies  No Known Medication Allergies  Social History  Alcohol  Never, 10/08/2018  Employment/School  Employed, 10/08/2018  Home/Environment  Lives with Spouse., 10/08/2018  Tobacco  Former smoker, N/A, 11/13/2018  Former smoker, N/A,  11/12/2018  Family History  Family history is unknown     [1]?Office Visit Note; Srinivas Shin JR., MD 10/08/2018 08:59 CDT  [2]?Office Visit Note; Srinivas Shin JR., MD 10/08/2018 08:59 CDT  [3]?Office Visit Note; Srinivas Shin JR., MD 10/08/2018 08:59 CDT

## 2022-09-13 NOTE — HISTORICAL [facility]
This is a historical note converted from Christopher. Formatting and pictures may have been removed.  Please reference Christopher for original formatting and attached multimedia. Chief Complaint  RT TKA/SX 06/03/19  History of Present Illness  79-year-old male presents today for follow-up after total knee arthroplasty on the right side. ?Is 2 weeks out. ?Doing very well. ?Pain is well controlled. ?He is walking with a walker or at times nothing. ?He is very happy with his recovery.  Review of Systems  Denies fevers, chills, chest pain, shortness of breath. Comprehensive review of systems performed and otherwise negative except as noted in HPI.  Physical Exam  Vitals & Measurements  T:?36.6? ?C (Oral)? HR:?87(Peripheral)? BP:?132/66?  HT:?175?cm? WT:?96.8?kg? BMI:?31.61?  General: No acute distress, alert and oriented, healthy appearing?  HEENT: Head is atraumatic, mucous membranes are moist  Neck: Supples, no JVD  Cardiovascular: Palpable dorsalis pedis and posterior tibial pulses, regular rate and rhythm to those pulses  Lungs: Breathing non-labored  Skin: no rashes appreciated  Neurologic: Can flex and extend knees, ankles, and toes. Sensation is grossly intact  ?  Examination knee:  Incision clean dry and intact. No erythema or drainage or signs of infection.  Sensation intact distally to left foot  Positive FHL/EHL/gastrocsoleus/tib ant  Brisk capillary refill to left foot  No swelling or signs of DVT.  Range of motion: 0 to 95  Stable to varus valgus  Stable to anterior and posterior drawer  Assessment/Plan  1.?Aftercare following joint replacement?Z47.1  ?Patient progressing well following total knee arthroplasty in the right side. ?We will discontinue staples today and see him back?in a month  Ordered:  Clinic Follow up, *Est. 07/18/19 15:15:00 CDT, Order for future visit, Aftercare following joint replacement, [facility]  Post-Op follow-up visit [id number] PC, Aftercare following joint replacement, [facility],  06/18/19 16:43:00 CDT  PT/OT External Referral, 06/18/19 16:43:00 CDT, Aftercare following joint replacement, Anit Grav Hip Abductor & Extensor Exc.  Isotonic hamstring & Closed Chain Quad  Stretch and Strengthen  No Dry Needling  Therapeutic Excercise, 3 X Week, Patient has IV, Standard Precau...  XR Knee Right 4 or More Views, Routine, *Est. 07/16/19 3:00:00 CDT, Arthritis, None, Ambulatory, Patient Has IV?, Rad Type, Order for future visit, Aftercare following joint replacement, Not Scheduled, *Est. 07/16/19 3:00:00 CDT  ?  Referrals  Clinic Follow up, *Est. 07/18/19 15:15:00 CDT, Order for future visit, Aftercare following joint replacement, [facility]  PT/OT External Referral, 06/18/19 16:43:00 CDT, Aftercare following joint replacement, Anit Grav Hip Abductor & Extensor Exc.  Isotonic hamstring & Closed Chain Quad  Stretch and Strengthen  No Dry Needling  Therapeutic Excercise, 3 X Week, Patient has IV, Standard Precau...   Problem List/Past Medical History  Ongoing  Acute myocardial infarction  Asthma  CAD - Coronary artery disease  Diabetes mellitus  Failed total joint replacement  Glasses  H/O shoulder replacement  Hyperlipidemia  Hypertension  Obesity  Rotator cuff tear  Historical  No qualifying data  Procedure/Surgical History  FLAVIA CROFT Total Knee Arthroplasty (Right) (06/03/2019)  Replacement of Right Knee Joint with Synthetic Substitute, Open Approach (06/03/2019)  Robotic Assisted Procedure of Lower Extremity, Open Approach (06/03/2019)  Removal of Synthetic Substitute from Right Shoulder Joint, Open Approach (11/13/2018)  Replacement of Right Shoulder Joint with Reverse Ball and Socket Synthetic Substitute, Open Approach (11/13/2018)  Total Reverse Shoulder (Right) (11/13/2018)  Stent placement (2017)  Appendectomy  Shoulder replacement   Medications  aspirin 325 mg oral Delayed Release (EC) tablet, 325 mg= 1 tab(s), Oral, Daily  ATORVASTATIN 80 MG TABLET, 1 tab, Oral, At  Bedtime  CeleBREX, 200 mg= 1 cap(s), Oral, Once  Colace 100 mg oral capsule, 200 mg= 2 cap(s), Oral, Daily  gabapentin 300 mg oral capsule, 300 mg= 1 cap(s), Oral, Once  LATANOPROST 0 005 SOLN, 1 drop, Daily  LISINOPRIL 20 MG TAB, 1 tab, Oral, Daily  METFORMIN 500MG TAB, 1 tab, Oral, BID  NITROGLYCERIN 0 4 MG TAB, 1 tab, SL, q5min, PRN  omeprazole, 20 mg, Oral, Daily, PRN,? ?Still taking, not as prescribed: Takes only prn- OTC  [place] Milk of Magnesia 8% oral suspension, 2.4 gm= 30 mL, Oral, BID, PRN  Tylenol, 1000 mg= 2 tab(s), Oral, Once  Tylenol, 500 mg= 1 tab(s), Oral, q4hr  VENTOLIN HFA 90 MCG INHALER, 2 puff, INH, BID,? ?Not taking: Last Dose Date/Time Unknown Last Dose Date/Time Unknown  Zofran ODT 4 mg oral tablet, disintegrating, 4 mg= 1 tab(s), Oral, q6hr, PRN, 1 refills  Allergies  No Known Medication Allergies  Social History  Abuse/Neglect  No, 06/18/2019  Alcohol  Never, 10/08/2018  Employment/School  Retired, 05/14/2019  Exercise  Home/Environment  Lives with Spouse., 10/08/2018  Nutrition/Health  Regular, 12/12/2018  Tobacco  Former smoker, quit more than 30 days ago, N/A, 06/18/2019  Family History  Asthma.: Sister.  Health Maintenance  Health Maintenance  ???Pending?(in the next year)  ??? ??OverDue  ??? ? ? ?Coronary Artery Disease Maintenance-Antiplatelet Agent Prescribed due??and every?  ??? ? ? ?Coronary Artery Disease Maintenance-Lipid Lowering Therapy due??and every?  ??? ? ? ?Advance Directive due??01/01/19??and every 1??year(s)  ??? ? ? ?Geriatric Depression Screening due??01/01/19??and every 1??year(s)  ??? ??Due?  ??? ? ? ?ADL Screening due??06/18/19??and every 1??year(s)  ??? ? ? ?Asthma Management-Asthma Education due??06/18/19??and every 6??month(s)  ??? ? ? ?Asthma Management-[place] Flow due??06/18/19??Variable frequency  ??? ? ? ?Asthma Management-Written Action Plan due??06/18/19??and every 6??month(s)  ??? ? ? ?Diabetes Maintenance-Eye Exam due??06/18/19??and every?  ??? ? ?  ?Diabetes Maintenance-Fasting Lipid Profile due??06/18/19??and every?  ??? ? ? ?Diabetes Maintenance-Foot Exam due??06/18/19??and every?  ??? ? ? ?Diabetes Maintenance-Microalbumin due??06/18/19??Variable frequency  ??? ? ? ?Hypertension Management-Education due??06/18/19??and every 1??year(s)  ??? ? ? ?Pneumococcal Vaccine due??06/18/19??Variable frequency  ??? ? ? ?Pneumococcal Vaccine due??06/18/19??and every?  ??? ? ? ?Tetanus Vaccine due??06/18/19??and every 10??year(s)  ??? ? ? ?Zoster Vaccine due??06/18/19??and every 100??year(s)  ??? ??Refused?  ??? ? ? ?Cognitive Screening due??01/01/19??and every 1??year(s)  ??? ??Due In Future?  ??? ? ? ?Fall Risk Assessment not due until??01/01/20??and every 1??year(s)  ??? ? ? ?Functional Assessment not due until??01/01/20??and every 1??year(s)  ??? ? ? ?Obesity Screening not due until??01/01/20??and every 1??year(s)  ??? ? ? ?Diabetes Maintenance-HgbA1c not due until??05/13/20??and every 1??year(s)  ??? ? ? ?Diabetes Maintenance-Urine Dipstick not due until??05/14/20??and every 1??year(s)  ??? ? ? ?Hypertension Management-BMP not due until??06/04/20??and every 1??year(s)  ??? ? ? ?Coronary Artery Disease Maintenance-BMP not due until??06/05/20??and every 1??year(s)  ??? ? ? ?Diabetes Maintenance-Serum Creatinine not due until??06/05/20??and every 1??year(s)  ??? ? ? ?Aspirin Therapy for CVD Prevention not due until??06/06/20??and every 1??year(s)  ??? ? ? ?Asthma Management-Spirometry not due until??06/06/20??and every 1??year(s)  ??? ? ? ?Coronary Artery Disease Maintenance-Electrocardiogram not due until??06/06/20??and every 1??year(s)  ??? ? ? ?Hypertension Management-Blood Pressure not due until??06/17/20??and every 1??year(s)  ???Satisfied?(in the past 1 year)  ??? ??Satisfied?  ??? ? ? ?Aspirin Therapy for CVD Prevention on??06/06/19.??Satisfied by Paz Moya NP  ??? ? ? ?Asthma Management-Asthma Medication Prescribed on??06/04/19.??Satisfied by Nita PARKER,  Paz SÁNCHEZ  ??? ? ? ?Asthma Management-Spirometry on??06/06/19.??Satisfied by Gisel Mccall RN  ??? ? ? ?Blood Pressure Screening on??06/18/19.??Satisfied by Sobeida Hoffmann LPN  ??? ? ? ?Body Mass Index Check on??06/18/19.??Satisfied by Sobeida Hoffmann LPN  ??? ? ? ?Coronary Artery Disease Maintenance-Electrocardiogram on??06/06/19.  ??? ? ? ?Coronary Artery Disease Maintenance-Antiplatelet Agent Prescribed on??06/06/19.??Satisfied by Paz Moya NP  ??? ? ? ?Coronary Artery Disease Maintenance-Lipid Lowering Therapy on??06/05/19.??Satisfied by Yung Oshea RN  ??? ? ? ?Coronary Artery Disease Maintenance-BMP on??06/05/19.??Satisfied by Gal Izaguirre  ??? ? ? ?Depression Screening on??05/08/19.??Satisfied by Tonja Barnes  ??? ? ? ?Diabetes Maintenance-HgbA1c on??05/14/19.??Satisfied by Tea Amador  ??? ? ? ?Diabetes Maintenance-Serum Creatinine on??06/05/19.??Satisfied by Gal Izaguirre  ??? ? ? ?Diabetes Maintenance-Urine Dipstick on??05/14/19.??Satisfied by Gal Izaguirre  ??? ? ? ?Diabetes Screening on??06/05/19.??Satisfied by Gal Izaguirre  ??? ? ? ?Fall Risk Assessment on??06/06/19.??Satisfied by Gisel Mccall RN  ??? ? ? ?Functional Assessment on??06/18/19.??Satisfied by Sobeida Hoffmann LPN  ??? ? ? ?Hypertension Management-BMP on??06/05/19.??Satisfied by Gal Izaguirre  ??? ? ? ?Hypertension Management-Blood Pressure on??06/18/19.??Satisfied by Sobeida Hoffmann LPN  ??? ? ? ?Influenza Vaccine on??02/06/19.??Satisfied by Tonja Barnes  ??? ? ? ?Obesity Screening on??06/18/19.??Satisfied by Sobeida Hoffmann LPN  ??? ? ? ?Smoking Cessation (Coronary Artery Disease) on??11/15/18.??Satisfied by Guy Barone RN  ??? ? ? ?Smoking Cessation (Diabetes) on??11/15/18.??Satisfied by Guy Barone RN  ??? ??Refused?  ??? ? ? ?Cognitive Screening on??05/14/19.??Recorded by Luis A Whittington  ?  ?  Diagnostic Results  AP lateral right knee reviewed. ?Patients implants appear well fixed. ?No  signs of loosening or subsidence noted.

## 2022-09-13 NOTE — HISTORICAL [facility]
This is a historical note converted from Christopher. Formatting and pictures may have been removed.  Please reference Christopher for original formatting and attached multimedia. Chief Complaint  6 month s/p Right rev TSA here for eval and xrays. Doing good states motion is getting better. sx 11/14/18. ?OOGL.  History of Present Illness  11/14/2018: Revision total shoulder arthroplasty to reverse shoulder arthroplasty  ?   He returns today. Shoulder pain is resolved. 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 He is happy with his results.  Review of Systems  Comprehensive review of system?was performed with no exceptions other than noted in the history of present illness [2]  Physical Exam  Vitals & Measurements  BP:?132/88?  HT:?172.72?cm? WT:?93.5?kg? BMI:?31.34?  Gen: WN, WD, NAD  Card/Res: NL breathing, +distal pulses  Abdomen: ND  Shoulder Exam:??????????Right??????????Left  Skin:??????????????????????????????Normal???????Normal  AC joint tenderness:??????????None??????????None  Forward Flexion:?????????????160 ??????????180  Abduction:?????????????????????110??????????? 180  External Rotation:??????????????40??????????????80  Internal Rotation?????????????? 80 ???????????? 80  ?   N-V status:?????????????????????????Intact??????????Intact  ?   C-spine: Normal ROM, NT [3]  ?  DASH:  2/6/2019: 50  Assessment/Plan  Status post reverse total replacement of right shoulder?Z96.611  ? Improved status post above. ?Activities as tolerated. ?I will see him back in 6 months?with radiographs the right shoulder and a D BHARATI score  Ordered:  Clinic Follow up, *Est. 11/08/19 3:00:00 CST, Order for future visit, Status post reverse total replacement of right shoulder, Orthopaedics  Office/Outpatient Visit Level 2 Established 14441 , Status post reverse total replacement of right shoulder, [facility], 05/08/19 10:34:00 CDT  XR Shoulder Right Minimum 2 Views, Routine, 05/08/19 10:14:00  CDT, Other (please specify), None, Ambulatory, Patient Has IV?, Rad Type, Status post reverse total replacement of right shoulder, Not Scheduled, 05/08/19 10:14:00 CDT  ?  Referrals  Clinic Follow up, *Est. 11/08/19 3:00:00 CST, Order for future visit, Status post reverse total replacement of right shoulder, [facility]   Problem List/Past Medical History  Ongoing  Acute myocardial infarction  Asthma  CAD - Coronary artery disease  Diabetes mellitus  Failed total joint replacement  Glasses  H/O shoulder replacement  Hyperlipidemia  Hypertension  Obesity  Rotator cuff tear  Historical  No qualifying data  Procedure/Surgical History  Removal of Synthetic Substitute from Right Shoulder Joint, Open Approach (11/13/2018)  Replacement of Right Shoulder Joint with Reverse Ball and Socket Synthetic Substitute, Open Approach (11/13/2018)  Total Reverse Shoulder (Right) (11/13/2018)  Stent placement (2017)  Appendectomy  Shoulder replacement   Medications  AMLODIPINE 5MG TAB,? ?Not taking: Last Dose Date/Time Unknown  aspirin 325 mg oral Delayed Release (EC) tablet, 325 mg= 1 tab(s), Oral, Daily  ATORVASTATIN 80 MG TABLET, 1 tab, Oral, At Bedtime  CeleBREX, 200 mg= 1 cap(s), Oral, Once  CLOPIDOGREL 75 MG TABLET, 75 mg= 1 tab(s), Oral, Daily,? ?Not taking: Last Dose Date/Time Unknown  gabapentin 300 mg oral capsule, 300 mg= 1 cap(s), Oral, Once  ISOSORBIDE MN ER 30MG TAB, 1 tab, Oral, Daily,? ?Not taking: Last Dose Date/Time Unknown  LATANOPROST 0 005 SOLN, 1 drop, Daily,? ?Not taking: Last Dose Date/Time Unknown  LISINOPRIL 20 MG TAB, 1 tab, Oral, Daily  METFORMIN 500MG TAB, 1 tab, Oral, BID  METOPROLOL TART 25MG TAB, 1 tab, Oral, At Bedtime,? ?Not taking: Last Dose Date/Time Unknown  NITROGLYCERIN 0 4 MG TAB, 1 tab, SL, q5min  omeprazole, 20 mg, Oral, Daily  OXYCOD APAP 5 325MG TAB, 1 tab, Oral, q4hr, PRN,? ?Not taking: PRN Last Dose Date/Time Unknown  Percocet 5/325 oral tablet, 1 tab(s), Oral, q6hr, PRN,? ?Not taking:  Last Dose Date/Time Unknown  TAMSULOSIN HCL 0.4 MG CAPSULE,? ?Not taking: Last Dose Date/Time Unknown  TRAVATAN Z 0 004 SOLN, 1 drop, Daily,? ?Not taking: Last Dose Date/Time Unknown  Tylenol, 1000 mg= 2 tab(s), Oral, Once  VENTOLIN HFA 90 MCG INHALER, 2 puff, INH, BID,? ?Not taking: Last Dose Date/Time Unknown  Allergies  No Known Medication Allergies  Social History  Alcohol  Never, 10/08/2018  Employment/School  Employed, 10/08/2018  Exercise  Home/Environment  Lives with Spouse., 10/08/2018  Nutrition/Health  Regular, 12/12/2018  Tobacco  Former smoker, quit more than 30 days ago, No, 05/08/2019  Former smoker, quit more than 30 days ago, No, 05/07/2019  Family History  Asthma.: Sister.  Health Maintenance  Health Maintenance  ???Pending?(in the next year)  ??? ??OverDue  ??? ? ? ?Coronary Artery Disease Maintenance-Antiplatelet Agent Prescribed due??and every?  ??? ? ? ?Coronary Artery Disease Maintenance-Lipid Lowering Therapy due??and every?  ??? ? ? ?Advance Directive due??01/01/19??and every 1??year(s)  ??? ? ? ?Cognitive Screening due??01/01/19??and every 1??year(s)  ??? ? ? ?Functional Assessment due??01/01/19??and every 1??year(s)  ??? ? ? ?Geriatric Depression Screening due??01/01/19??and every 1??year(s)  ??? ? ? ?Smoking Cessation due??01/01/19??and every 180??day(s)  ??? ??Due?  ??? ? ? ?ADL Screening due??05/08/19??and every 1??year(s)  ??? ? ? ?Asthma Management-Asthma Education due??05/08/19??and every 6??month(s)  ??? ? ? ?Asthma Management-[place] Flow due??05/08/19??Variable frequency  ??? ? ? ?Asthma Management-Written Action Plan due??05/08/19??and every 6??month(s)  ??? ? ? ?Diabetes Maintenance-Eye Exam due??05/08/19??and every?  ??? ? ? ?Diabetes Maintenance-Fasting Lipid Profile due??05/08/19??and every?  ??? ? ? ?Diabetes Maintenance-Foot Exam due??05/08/19??and every?  ??? ? ? ?Diabetes Maintenance-Microalbumin due??05/08/19??Variable frequency  ??? ? ? ?Hypertension  Management-Education due??05/08/19??and every 1??year(s)  ??? ? ? ?Pneumococcal Vaccine due??05/08/19??Variable frequency  ??? ? ? ?Pneumococcal Vaccine due??05/08/19??and every?  ??? ? ? ?Tetanus Vaccine due??05/08/19??and every 10??year(s)  ??? ? ? ?Zoster Vaccine due??05/08/19??and every 100??year(s)  ??? ??Due In Future?  ??? ? ? ?Asthma Management-Spirometry not due until??11/14/19??and every 1??year(s)  ??? ? ? ?Aspirin Therapy for CVD Prevention not due until??11/15/19??and every 1??year(s)  ??? ? ? ?Hypertension Management-BMP not due until??12/12/19??and every 1??year(s)  ??? ? ? ?Coronary Artery Disease Maintenance-BMP not due until??12/12/19??and every 1??year(s)  ??? ? ? ?Diabetes Maintenance-Serum Creatinine not due until??12/12/19??and every 1??year(s)  ??? ? ? ?Diabetes Maintenance-Urine Dipstick not due until??12/12/19??and every 1??year(s)  ??? ? ? ?Coronary Artery Disease Maintenance-Electrocardiogram not due until??12/17/19??and every 1??year(s)  ??? ? ? ?Fall Risk Assessment not due until??01/01/20??and every 1??year(s)  ??? ? ? ?Obesity Screening not due until??01/01/20??and every 1??year(s)  ??? ? ? ?Diabetes Maintenance-HgbA1c not due until??02/06/20??and every 1??year(s)  ??? ? ? ?Hypertension Management-Blood Pressure not due until??05/06/20??and every 1??year(s)  ???Satisfied?(in the past 1 year)  ??? ??Satisfied?  ??? ? ? ?Aspirin Therapy for CVD Prevention on??11/15/18.??Satisfied by Guy Barone RN  ??? ? ? ?Asthma Management-Asthma Medication Prescribed on??12/17/18.??Satisfied by Perry Doherty MD  ??? ? ? ?Asthma Management-Spirometry on??11/14/18.??Satisfied by Lizbeth Espinoza RN  ??? ? ? ?Blood Pressure Screening on??05/08/19.??Satisfied by Tonja Barnes L. L.  ??? ? ? ?Body Mass Index Check on??05/08/19.??Satisfied by Tonja Barnes L. L.  ??? ? ? ?Coronary Artery Disease Maintenance-Electrocardiogram on??12/17/18.??Satisfied by Tiera Murillo RN  ??? ? ? ?Coronary Artery Disease  Maintenance-BMP on??12/12/18.??Satisfied by Gal Izaguirre  ??? ? ? ?Coronary Artery Disease Maintenance-Lipid Lowering Therapy on??11/14/18.??Satisfied by Lizbeth Espinoza RN  ??? ? ? ?Coronary Artery Disease Maintenance-Antiplatelet Agent Prescribed on??11/13/18.  ??? ? ? ?Depression Screening on??05/08/19.??Satisfied by Tonja Barnes L. LXiomara  ??? ? ? ?Diabetes Maintenance-HgbA1c on??12/12/18.??Satisfied by Gal Izaguirre  ??? ? ? ?Diabetes Maintenance-Serum Creatinine on??12/12/18.??Satisfied by Gal Izaguirre  ??? ? ? ?Diabetes Maintenance-Urine Dipstick on??12/12/18.??Satisfied by Carlin Hung  ??? ? ? ?Diabetes Screening on??12/12/18.??Satisfied by Gal Izaguirre  ??? ? ? ?Fall Risk Assessment on??05/08/19.??Satisfied by Tonja Barnes L. L.  ??? ? ? ?Hypertension Management-BMP on??12/12/18.??Satisfied by Gal Izaguirre  ??? ? ? ?Hypertension Management-Blood Pressure on??05/08/19.??Satisfied by Tonja Barnes L. L.  ??? ? ? ?Influenza Vaccine on??02/06/19.??Satisfied by Tonja Barnes L. LXiomara  ??? ? ? ?Obesity Screening on??05/08/19.??Satisfied by Tonja Barnes L. L.  ??? ? ? ?Smoking Cessation on??11/15/18.??Satisfied by Guy Barone RN  ??? ? ? ?Smoking Cessation (Coronary Artery Disease) on??11/15/18.??Satisfied by Guy Barone RN  ??? ? ? ?Smoking Cessation (Diabetes) on??11/15/18.??Satisfied by Guy Barone RN  ?  ?  Diagnostic Results  Shoulder radiographs: 4 views of the shoulder were reviewed today which shows appropriate implant position.     [1]?Office Visit Note; Srinivas Shin JR., MD 02/06/2019 11:06 CST  [2]?Office Visit Note; Srinivas Shin JR., MD 02/06/2019 11:06 CST  [3]?Office Visit Note; Srinivas Shin JR., MD 02/06/2019 11:06 CST  [4]?Office Visit Note; Srinivas Shin JR., MD 02/06/2019 11:06 CST

## 2022-09-13 NOTE — HISTORICAL [facility]
This is a historical note converted from Christopher. Formatting and pictures may have been removed.  Please reference Christopher for original formatting and attached multimedia. Chief Complaint  1 week follow up Mario should. pain. Here to discuss Right shoulder surgery. Lab results. LT- TSA and RT RTC tear.  History of Present Illness  He returns today with a new complaint. ?He has had continued pain over his left shoulder and inability to move the right shoulder.? He status post lab work on the right?shoulder that did not show an infection.? He is now almost symptomatic actually over his right knee. ?The knee pains been bothering for a very long time. ?He is tried anti-inflammatory medicines without significant relief. ?He tried some offloading.? The pain is global in nature.? 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 He denies any numbness or tingling.  Review of Systems  Comprehensive review of system?was performed with no exceptions other than noted in the history of present illness  Physical Exam  Vitals & Measurements  BP:?160/70?  HT:?175?cm? HT:?175?cm? HT:?175?cm? WT:?101.15?kg? WT:?101.15?kg? WT:?101.15?kg? BMI:?33.03?  Gen: WN, WD, NAD  Card/Res: NL breathing, +distal pulses  Abdomen: ND  Standing exam  stance:?Varus alignment, no significant leg-length discrepancy  gait:?Antalgic limp  ?   Knee examination  - General comments: unremarkable appearance  ?   - Tenderness: Medial and lateral joint line  ?   Knee??????????RIGHT??????????LEFT  Skin: ??????????Intact ??????????Intact  ROM:??????????0-130??????????0-130  Effusion:??????++????????????? Neg  MJL TTP:??????+????????????? Neg  LJL TTP:????????+????????????? Neg  Diann:???? +????????????? Neg  Pat crep:???????+????????????????Neg  Patella TTPs: Neg ???????????????Neg  Patella grind: Neg??????????? ?Neg  Lachman: ?????Neg ????????????????????Neg  Pivot shift: ?????Neg ????????????  Neg  Valgus stress: Neg ???????????????Neg  Varus stress: Neg ???????????????Neg  Posterior drawer: Neg ??????????Neg  ?   N-V ????????????????????intact??????????intact  Hip:?????????????????????????nml?????????? nml  ?   Lower extremity edema:Negative  ?  Assessment/Plan  1.?OA (osteoarthritis) of knee  ? His best surgical option is total knee arthroplasty. ?We discussed the details of the procedure and expected postoperative course. ?We will get a CT scan for?surgical planning and I will send him to the joint class. ?I will see him in 2weeks to review.  Ordered:  Clinic Follow up, *Est. 10/29/18 3:00:00 CDT, Order for future visit, OA (osteoarthritis) of knee, [facility]  CT Ext Lower Right Robotic Protocol, Routine, *Est. 10/22/18 3:00:00 CDT, Other (please specify), None, Ambulatory, Rad Type, Order for future visit, OA (osteoarthritis) of knee, Schedule this test, [facility], *Est. 10/22/18 3:00:00 CDT  Office/Outpatient Visit Level 3 Established 18933 PC, OA (osteoarthritis) of knee, [facility], 10/15/18 13:17:00 CDT  ?  Right knee pain  Ordered:  XR Knee Right 3 Views, Routine, 10/15/18 13:02:00 CDT, Pain, None, Ambulatory, Rad Type, Right knee pain, Not Scheduled, 10/15/18 13:02:00 CDT  ?   Problem List/Past Medical History  Ongoing  Glasses  H/O shoulder replacement  Hyperlipidemia  Hypertension  Obesity  Rotator cuff tear  Historical  No qualifying data  Procedure/Surgical History  Appendectomy  Shoulder replacement  Stent placement   Medications  AMLODIPINE 5MG TAB  aspirin 81 mg oral Delayed Release (EC) tablet, 81 mg= 1 tab(s), Oral, Daily  ATORVASTATIN 80 MG TABLET  CLOPIDOGREL 75 MG TABLET, 75 mg= 1 tab(s), Oral, Daily  ISOSORBIDE MN ER 30MG TAB  LATANOPROST 0 005 SOLN  LISINOPRIL 20 MG TAB  METFORMIN 500MG TAB  METOPROLOL TART 25MG TAB  NITROGLYCERIN 0 4 MG TAB  omeprazole, Oral, Daily  TRAVATAN Z 0 004 SOLN  VENTOLIN HFA 90 MCG INHALER  Allergies  No Known  Medication Allergies  Social History  Alcohol  Never, 10/08/2018  Employment/School  Employed, 10/08/2018  Home/Environment  Lives with Spouse., 10/08/2018  Tobacco  Former smoker, N/A, 10/08/2018  Family History  Family history is unknown  Health Maintenance  Health Maintenance  ???Pending?(in the next year)  ??? ??Due?  ??? ? ? ?ADL Screening due??10/15/18??and every 1??year(s)  ??? ? ? ?Advance Directive due??10/15/18??and every 1??year(s)  ??? ? ? ?Cognitive Screening due??10/15/18??and every 1??year(s)  ??? ? ? ?Functional Assessment due??10/15/18??and every 1??year(s)  ??? ? ? ?Geriatric Depression Screening due??10/15/18??and every 1??year(s)  ??? ? ? ?Hypertension Management-BMP due??10/15/18??and every?  ??? ? ? ?Hypertension Management-Education due??10/15/18??and every 1??year(s)  ??? ? ? ?Pneumococcal Vaccine due??10/15/18??Variable frequency  ??? ? ? ?Pneumococcal Vaccine due??10/15/18??and every?  ??? ? ? ?Smoking Cessation due??10/15/18??Variable frequency  ??? ? ? ?Tetanus Vaccine due??10/15/18??and every 10??year(s)  ??? ? ? ?Zoster Vaccine due??10/15/18??and every 100??year(s)  ??? ??Due In Future?  ??? ? ? ?Aspirin Therapy for CVD Prevention not due until??10/08/19??and every 1??year(s)  ???Satisfied?(in the past 1 year)  ??? ??Satisfied?  ??? ? ? ?Aspirin Therapy for CVD Prevention on??10/08/18.  ??? ? ? ?Blood Pressure Screening on??10/15/18.??Satisfied by Tonja Barnes  ??? ? ? ?Body Mass Index Check on??10/15/18.??Satisfied by Tonja Barnes.  ??? ? ? ?Depression Screening on??10/15/18.??Satisfied by Tonja Barnes.  ??? ? ? ?Fall Risk Assessment on??10/15/18.??Satisfied by Tonja Barnes.  ??? ? ? ?Hypertension Management-Blood Pressure on??10/15/18.??Satisfied by Tonja Barnes.  ??? ? ? ?Influenza Vaccine on??10/15/18.??Satisfied by Tonja Barnes.  ??? ? ? ?Obesity Screening on??10/15/18.??Satisfied by Tonja Barnes.  ?  ?  Diagnostic Results  Knee  radiographs 10/15/2018:?3 views of the knee show a?advanced arthritis      After further discussion, he would like his right shoulder addressed first.?  ?  I have recommended right?reverse total shoulder arthroplasty.? I discussed with him the details the procedure and expected per postoperative course.? I discussed the benefits of surgery which be to increase his motion. ?I discussed the risks of surgery?which are small but could be significant?if he has continued pain, instability, infection, or weakness.? After discussion he like to proceed. ?We will get?medical clearance preoperatively.

## 2022-09-13 NOTE — HISTORICAL [facility]
This is a historical note converted from Christopher. Formatting and pictures may have been removed.  Please reference Christopher for original formatting and attached multimedia. Chief Complaint  HAD BOTH SHOULDERS REPLACED BY DR. MAXWELL IN 2015. LEFT SHOULDER IS CAUSING HIM ALOT OF PAIN. RIGHT ARN HE HAS 8-% USE OF IT.  History of Present Illness  He is a pleasant 78-year-old male?who is status post?left?total shoulder arthroplasty in 2011 and right total shoulder arthroplasty and 2015 or 2016. ?He is noticed some pain over his left shoulder particularly with abduction which bother him over the last?year or so. ?He is noticed a decrease in function of his right shoulder since the procedure.? Specific on the right shoulder he has trouble with overhead lifting. ?He is noticed some weakness in the shoulder as well.  Review of Systems  Comprehensive review of system?was performed with no exceptions other than noted in the history of present illness  Physical Exam  Vitals & Measurements  BP:?160/70?  HT:?175?cm? HT:?175?cm? WT:?101.15?kg? WT:?101.15?kg? BMI:?33.03?  Gen: WN, WD, NAD  Card/Res: NL breathing, +distal pulses  Abdomen: ND  Shoulder Exam:??????????Right??????????Left  Skin:??????????????????????????????Normal???????Normal  AC joint tenderness:??????????None??????????None  Forward Flexion:?????????????90 ??????????160  Abduction:?????????????????????50??????????? 100  External Rotation:??????????????30?????????????60  Internal Rotation?????????????? 80 ???????????? 80  Supraspinatus stress test???????++??????????Neg  Franco Impingement:?? ?????Neg???????????+  Neer Impingement:?????????????Neg??????????? +  Apprehension:???????????????????? Neg??????????Neg  OBriens:????????????????????????????Neg????????? Neg  Speeds test:??????????????????????? Neg??????????Neg  Strength:  External Rotation:???????????????4/5???????????????5/5  Lift Off/belly press:????????????4/5???????????????5/5  ?   N-V  status:?????????????????????????Intact??????????Intact  ?   C-spine: Normal ROM, NT  ?  ?  Assessment/Plan  1.?H/O shoulder replacement  ? Left shoulder with?bursitis status post?total shoulder arthroplasty. ?He may be a touch?over stuffed.? I have recommended?anti-inflammatory medicines.  Ordered:  Clinic Follow up, *Est. 10/15/18 3:00:00 CDT, Order for future visit, H/O shoulder replacement  Rotator cuff tear, [facility]  Office/Outpatient Visit Level 3 New 75086 PC, H/O shoulder replacement  Rotator cuff tear, [facility], 10/08/18 8:59:00 CDT  ?  2.?Rotator cuff tear  He has a rotator cuff dysfunction on exam.? We will get some labs to rule out an indolent?and I will see him back after a week. ?Also get the operative report on the right. ?I think his best surgical option would be a reverse shoulder arthroplasty  Ordered:  Clinic Follow up, *Est. 10/15/18 3:00:00 CDT, Order for future visit, H/O shoulder replacement  Rotator cuff tear, [facility]  Office/Outpatient Visit Level 3 New 55212 , H/O shoulder replacement  Rotator cuff tear, [facility], 10/08/18 8:59:00 CDT  ?  Bilateral shoulder pain  Ordered:  XR Shoulder Left Minimum 2 Views, Routine, 10/08/18 8:35:00 CDT, Pain, None, Ambulatory, Rad Type, Bilateral shoulder pain, 10/08/18 8:35:00 CDT  XR Shoulder Right Minimum 2 Views, Routine, 10/08/18 8:36:00 CDT, Pain, None, Ambulatory, Rad Type, Bilateral shoulder pain, 10/08/18 8:36:00 CDT  ?   Problem List/Past Medical History  Ongoing  Glasses  H/O shoulder replacement  Hyperlipidemia  Hypertension  Obesity  Rotator cuff tear  Historical  No qualifying data  Procedure/Surgical History  Appendectomy  Shoulder replacement  Stent placement   Medications  AMLODIPINE 5MG TAB  aspirin 81 mg oral Delayed Release (EC) tablet, 81 mg= 1 tab(s), Oral, Daily  ATORVASTATIN 80 MG TABLET  CLOPIDOGREL 75 MG TABLET, 75 mg= 1 tab(s), Oral, Daily  ISOSORBIDE MN ER 30MG  TAB  LATANOPROST 0 005 SOLN  LISINOPRIL 20 MG TAB  METFORMIN 500MG TAB  METOPROLOL TART 25MG TAB  NITROGLYCERIN 0 4 MG TAB  omeprazole, Oral, Daily  TRAVATAN Z 0 004 SOLN  VENTOLIN HFA 90 MCG INHALER  Allergies  No Known Medication Allergies  Social History  Alcohol  Never, 10/08/2018  Employment/School  Employed, 10/08/2018  Home/Environment  Lives with Spouse., 10/08/2018  Tobacco  Former smoker, N/A, 10/08/2018  Family History  Family history is unknown  Health Maintenance  Health Maintenance  ???Pending?(in the next year)  ??? ??Due?  ??? ? ? ?ADL Screening due??10/08/18??and every 1??year(s)  ??? ? ? ?Advance Directive due??10/08/18??and every 1??year(s)  ??? ? ? ?Cognitive Screening due??10/08/18??and every 1??year(s)  ??? ? ? ?Fall Risk Assessment due??10/08/18??and every 1??year(s)  ??? ? ? ?Functional Assessment due??10/08/18??and every 1??year(s)  ??? ? ? ?Geriatric Depression Screening due??10/08/18??and every 1??year(s)  ??? ? ? ?Hypertension Management-Education due??10/08/18??and every 1??year(s)  ??? ? ? ?Pneumococcal Vaccine due??10/08/18??Variable frequency  ??? ? ? ?Smoking Cessation due??10/08/18??Variable frequency  ??? ? ? ?Tetanus Vaccine due??10/08/18??and every 10??year(s)  ??? ? ? ?Zoster Vaccine due??10/08/18??and every 100??year(s)  ???Satisfied?(in the past 1 year)  ??? ??Satisfied?  ??? ? ? ?Aspirin Therapy for CVD Prevention on??10/08/18.  ??? ? ? ?Blood Pressure Screening on??10/08/18.??Satisfied by Miriam Guerin  ??? ? ? ?Body Mass Index Check on??10/08/18.??Satisfied by Miriam Guerin  ??? ? ? ?Hypertension Management-Blood Pressure on??10/08/18.??Satisfied by Miriam Guerin  ??? ? ? ?Influenza Vaccine on??10/08/18.??Satisfied by Miriam Guerin  ??? ? ? ?Obesity Screening on??10/08/18.??Satisfied by Miriam Guerin  ?  ?  Diagnostic Results  Shoulder radiographs 10/8/2018: 4 views of bilateral shoulders show?some superior subluxation of the left.

## 2022-09-13 NOTE — HISTORICAL [facility]
This is a historical note converted from Christopher. Formatting and pictures may have been removed.  Please reference Christopher for original formatting and attached multimedia. Chief Complaint  Left shoulder pain for several years, Denies any falls or injuries.  History of Present Illness  11/14/2018: Revision right?total shoulder arthroplasty to reverse shoulder arthroplasty  ?   He returns today. He is noticed persistent left shoulder pain. ?The pain is located laterally along the shoulder. ?He notes it worse with activity. ?It somewhat bothersome at rest. ?He has been using?ibuprofen intermittently.  Review of Systems  Comprehensive review of system?was performed with no exceptions other than noted in the history of present illness  Physical Exam  Vitals & Measurements  BP:?126/72?  HT:?175?cm? WT:?94.34?kg? BMI:?30.81?  Gen: WN, WD, NAD  Card/Res: NL breathing, +distal pulses  Abdomen: ND  Shoulder Exam:??????????Right??????????Left  Skin:??????????????????????????????Normal???????Normal  AC joint tenderness:??????????None??????????None  Forward Flexion:?????????????160 ??????????170  Abduction:?????????????????????100??????????? 100  External Rotation: ????????????? 80??????????????80  Internal Rotation?????????????? 80 ???????????? 80  Supraspinatus stress test?????? Neg??????????Neg  Franco Impingement:?? ?????Neg ?????????? ?+  Neer Impingement:?????????????Neg??????????+  Apprehension:???????????????????? Neg??????????Neg  OBriens:????????????????????????????Neg????????? +  Speeds test:??????????????????????? Neg??????????Neg  Strength:  External Rotation:???????????????5/5???????????????5/5  Lift Off/belly press:????????????5/5???????????????5/5  ?   N-V status:?????????????????????????Intact??????????Intact  ?   C-spine: Normal ROM, NT  ?  ?  Assessment/Plan  1.?H/O shoulder replacement?Z96.619  Ordered:  Office/Outpatient Visit Level 3 Established [id number] PC, H/O shoulder replacement  Bursitis of left shoulder,  [facility], 03/11/20 9:47:00 CDT  ?  2.?Bursitis of left shoulder?M75.52  ?We will start prescription strength anti-inflammatory medicines.  Ordered:  Office/Outpatient Visit Level 3 Established [id number] PC, H/O shoulder replacement  [facility], 03/11/20 9:47:00 CDT  ?  Orders:  meloxicam, 7.5 mg = 1 tab(s), Oral, Daily, # 90 tab(s), 0 Refill(s), Pharmacy: [facility], 175, cm, Height/Length Dosing, 03/11/20 9:36:00 CDT, 94.34, kg, Weight Dosing, 03/11/20 9:36:00 CDT  Clinic Follow-up PRN, 03/11/20 9:47:00 CDT, Future Order, [facility]  XR Shoulder Left Minimum 2 Views, Routine, 03/11/20 9:07:00 CDT, Pain, None, Ambulatory, Patient Has IV?, Rad Type, Left shoulder pain, Not Scheduled, 03/11/20 9:07:00 CDT  Referrals  Clinic Follow-up PRN, 03/11/20 9:47:00 CDT, Future Order, [facility]   Problem List/Past Medical History  Ongoing  Acute myocardial infarction  Asthma  CAD - Coronary artery disease  Diabetes mellitus  Failed total joint replacement  Glasses  H/O shoulder replacement  Hyperlipidemia  Hypertension  Obesity  Rotator cuff tear  S/p reverse total shoulder arthroplasty  Historical  No qualifying data  Procedure/Surgical History  FLAVIA CROFT Total Knee Arthroplasty (Right) (06/03/2019)  Replacement of Right Knee Joint with Synthetic Substitute, Open Approach (06/03/2019)  Robotic Assisted Procedure of Lower Extremity, Open Approach (06/03/2019)  Removal of Synthetic Substitute from Right Shoulder Joint, Open Approach (11/13/2018)  Replacement of Right Shoulder Joint with Reverse Ball and Socket Synthetic Substitute, Open Approach (11/13/2018)  Total Reverse Shoulder (Right) (11/13/2018)  Stent placement (2017)  Appendectomy  Shoulder replacement   Medications  aspirin 325 mg oral Delayed Release (EC) tablet, 325 mg= 1 tab(s), Oral, Daily  ATORVASTATIN 80 MG TABLET, 1 tab, Oral, At Bedtime  CeleBREX, 200 mg= 1 cap(s), Oral, Once  Colace 100  mg oral capsule, 200 mg= 2 cap(s), Oral, Daily  gabapentin 300 mg oral capsule, 300 mg= 1 cap(s), Oral, Once  LATANOPROST 0 005 SOLN, 1 drop, Daily  LISINOPRIL 20 MG TAB, 1 tab, Oral, Daily  meloxicam 7.5 mg oral tablet, 7.5 mg= 1 tab(s), Oral, Daily  METFORMIN 500MG TAB, 1 tab, Oral, BID  NITROGLYCERIN 0 4 MG TAB, 1 tab, SL, q5min, PRN  omeprazole, 20 mg, Oral, Daily, PRN,? ?Still taking, not as prescribed: Takes only prn- OTC Last Dose Date/Time Unknown  Tylenol, 500 mg= 1 tab(s), Oral, q4hr,? ?Not taking: Last Dose Date/Time Unknown  Tylenol, 1000 mg= 2 tab(s), Oral, Once  VENTOLIN HFA 90 MCG INHALER, 2 puff, INH, BID,? ?Not taking: Last Dose Date/Time Unknown Last Dose Date/Time Unknown  Zofran ODT 4 mg oral tablet, disintegrating, 4 mg= 1 tab(s), Oral, q6hr, PRN, 1 refills,? ?Not taking: Last Dose Date/Time Unknown  Allergies  No Known Medication Allergies  Social History  Abuse/Neglect  No, 03/11/2020  Alcohol  Never, 10/08/2018  Employment/School  Retired, 05/14/2019  Exercise  Home/Environment  Lives with Spouse., 10/08/2018  Nutrition/Health  Regular, 12/12/2018  Tobacco  Former smoker, quit more than 30 days ago, No, 03/11/2020  Family History  Asthma.: Sister.  Health Maintenance  Health Maintenance  ???Pending?(in the next year)  ??? ??OverDue  ??? ? ? ?Coronary Artery Disease Maintenance-Antiplatelet Agent Prescribed due??and every?  ??? ? ? ?Coronary Artery Disease Maintenance-Lipid Lowering Therapy due??and every?  ??? ? ? ?Advance Directive due??01/01/20??and every 1??year(s)  ??? ? ? ?Geriatric Depression Screening due??01/01/20??and every 1??year(s)  ??? ??Due?  ??? ? ? ?ADL Screening due??03/11/20??and every 1??year(s)  ??? ? ? ?Asthma Management-Asthma Education due??03/11/20??and every 6??month(s)  ??? ? ? ?Asthma Management-[place]nel[place] Peak Flow due??03/11/20??Variable frequency  ??? ? ? ?Asthma Management-Written Action Plan due??03/11/20??and every 6??month(s)  ??? ? ? ?Diabetes Maintenance-Eye  Exam due??03/11/20??and every?  ??? ? ? ?Diabetes Maintenance-Foot Exam due??03/11/20??and every?  ??? ? ? ?Diabetes Maintenance-Fasting Lipid Profile due??03/11/20??and every?  ??? ? ? ?Hypertension Management-Education due??03/11/20??and every 1??year(s)  ??? ? ? ?Medicare Annual Wellness Exam due??03/11/20??and every 1??year(s)  ??? ? ? ?Pneumococcal Vaccine due??03/11/20??Variable frequency  ??? ? ? ?Pneumococcal Vaccine due??03/11/20??and every?  ??? ? ? ?Tetanus Vaccine due??03/11/20??and every 10??year(s)  ??? ? ? ?Zoster Vaccine due??03/11/20??and every 100??year(s)  ??? ??Refused?  ??? ? ? ?Cognitive Screening due??01/01/20??and every 1??year(s)  ??? ??Due In Future?  ??? ? ? ?Diabetes Maintenance-Urine Dipstick not due until??05/14/20??and every 1??year(s)  ??? ? ? ?Coronary Artery Disease Maintenance-BMP not due until??06/05/20??and every 1??year(s)  ??? ? ? ?Diabetes Maintenance-Serum Creatinine not due until??06/05/20??and every 1??year(s)  ??? ? ? ?Aspirin Therapy for CVD Prevention not due until??06/06/20??and every 1??year(s)  ??? ? ? ?Asthma Management-Spirometry not due until??06/06/20??and every 1??year(s)  ??? ? ? ?Coronary Artery Disease Maintenance-Electrocardiogram not due until??06/06/20??and every 1??year(s)  ??? ? ? ?Diabetes Maintenance-HgbA1c not due until??11/12/20??and every 1??year(s)  ??? ? ? ?Hypertension Management-BMP not due until??11/23/20??and every 1??year(s)  ??? ? ? ?Fall Risk Assessment not due until??01/01/21??and every 1??year(s)  ??? ? ? ?Functional Assessment not due until??01/01/21??and every 1??year(s)  ??? ? ? ?Obesity Screening not due until??01/01/21??and every 1??year(s)  ???Satisfied?(in the past 1 year)  ??? ??Satisfied?  ??? ? ? ?Advance Directive on??05/14/19.??Satisfied by Tana Colón RN  ??? ? ? ?Aspirin Therapy for CVD Prevention on??06/06/19.??Satisfied by Paz Moya NP  ??? ? ? ?Asthma Management-Spirometry on??06/06/19.??Satisfied by Stefany ADAMS,  Gisel JAUREGUI  ??? ? ? ?Asthma Management-Asthma Medication Prescribed on??06/04/19.??Satisfied by Paz Moya NP  ??? ? ? ?Blood Pressure Screening on??03/11/20.??Satisfied by Brooklyn Estes  ??? ? ? ?Body Mass Index Check on??03/11/20.??Satisfied by Brooklyn Estes  ??? ? ? ?Coronary Artery Disease Maintenance-Electrocardiogram on??06/06/19.  ??? ? ? ?Coronary Artery Disease Maintenance-Antiplatelet Agent Prescribed on??06/06/19.??Satisfied by Paz Moya NP  ??? ? ? ?Coronary Artery Disease Maintenance-Lipid Lowering Therapy on??06/05/19.??Satisfied by Yung Oshea RN  ??? ? ? ?Coronary Artery Disease Maintenance-BMP on??06/05/19.??Satisfied by Gal Izaguirre  ??? ? ? ?Depression Screening on??05/08/19.??Satisfied by Tonja Barnes  ??? ? ? ?Diabetes Maintenance-Serum Creatinine on??06/05/19.??Satisfied by Gal Izaguirre  ??? ? ? ?Diabetes Maintenance-HgbA1c on??05/14/19.??Satisfied by Tea Amador  ??? ? ? ?Diabetes Maintenance-Urine Dipstick on??05/14/19.??Satisfied by Gal Izaguirre  ??? ? ? ?Diabetes Screening on??06/05/19.??Satisfied by Gal Izaguirre  ??? ? ? ?Fall Risk Assessment on??03/11/20.??Satisfied by Brooklyn Estes  ??? ? ? ?Functional Assessment on??03/11/20.??Satisfied by Brooklyn Estes  ??? ? ? ?Hypertension Management-Blood Pressure on??03/11/20.??Satisfied by Brooklyn Estes  ??? ? ? ?Hypertension Management-BMP on??06/05/19.??Satisfied by Gal Izaguirre  ??? ? ? ?Obesity Screening on??03/11/20.??Satisfied by Brooklyn Estes  ??? ??Refused?  ??? ? ? ?Cognitive Screening on??05/14/19.??Recorded by Luis A Whittington  ?  Diagnostic Results  Shoulder radiographs show appropriate implant position. ?He may have worn his poly-down some.

## 2022-09-13 NOTE — HISTORICAL [facility]
This is a historical note converted from Christopher. Formatting and pictures may have been removed.  Please reference Christopher for original formatting and attached multimedia. Chief Complaint  6 MONTH F/U RIGHT TKA ON 6/3/19  History of Present Illness  79-year-old male presents today for follow-up of total knee arthroplasty in the right side. ?Patient is 9 months out. ?Overall doing very well. ?No complaints of pain or other issues. ?Very happy with his recovery?and satisfied with his improvement.  Review of Systems  Denies fevers, chills, chest pain, shortness of breath. Comprehensive review of systems performed and otherwise negative except as noted in HPI.  Physical Exam  Vitals & Measurements  T:?98.6? ?F (Oral)? BP:?126/72?  HT:?175?cm? WT:?94.34?kg? BMI:?30.8?  General: No acute distress, alert and oriented, healthy appearing?  HEENT: Head is atraumatic, mucous membranes are moist  Cardiovascular: Brisk capillary refill  Lungs: Breathing non-labored  Skin: no rashes appreciated  Neurologic: Sensation is grossly intact distally  ?  Examination knee:  Incision clean dry and intact. No erythema or drainage or signs of infection.  Sensation intact distally to left foot  Positive FHL/EHL/gastrocsoleus/tib ant  Brisk capillary refill to left foot  No swelling or signs of DVT.  Range of motion: 0 to 130  Stable to varus valgus  Stable to anterior and posterior drawer  Assessment/Plan  1.?Aftercare following right knee joint replacement surgery?Z47.1  ?Patients implants are stable. ?He is doing quite well. ?He is pain-free. ?We will plan to see him back?in 4 to 6 months at his year anniversary.  Ordered:  Clinic Follow up, *Est. 08/04/20 3:00:00 CDT, Order for future visit, Aftercare following right knee joint replacement surgery, [facility]  XR Knee Right 4 or More Views, Routine, *Est. 08/04/20 3:00:00 CDT, Arthritis, None, Ambulatory, Patient Has IV?, Rad Type, Order for future visit, Aftercare following right knee  joint replacement surgery, Not Scheduled, *Est. 08/04/20 3:00:00 CDT  ?  Referrals  Clinic Follow up, *Est. 08/04/20 3:00:00 CDT, Order for future visit, Aftercare following right knee joint replacement surgery, [facility]   Problem List/Past Medical History  Ongoing  Acute myocardial infarction  Asthma  CAD - Coronary artery disease  Diabetes mellitus  Failed total joint replacement  Glasses  H/O shoulder replacement  Hyperlipidemia  Hypertension  Obesity  Rotator cuff tear  S/p reverse total shoulder arthroplasty  Historical  No qualifying data  Procedure/Surgical History  FLAVIA CROFT Total Knee Arthroplasty (Right) (06/03/2019)  Replacement of Right Knee Joint with Synthetic Substitute, Open Approach (06/03/2019)  Robotic Assisted Procedure of Lower Extremity, Open Approach (06/03/2019)  Removal of Synthetic Substitute from Right Shoulder Joint, Open Approach (11/13/2018)  Replacement of Right Shoulder Joint with Reverse Ball and Socket Synthetic Substitute, Open Approach (11/13/2018)  Total Reverse Shoulder (Right) (11/13/2018)  Stent placement (2017)  Appendectomy  Shoulder replacement   Medications  aspirin 325 mg oral Delayed Release (EC) tablet, 325 mg= 1 tab(s), Oral, Daily  ATORVASTATIN 80 MG TABLET, 1 tab, Oral, At Bedtime  CeleBREX, 200 mg= 1 cap(s), Oral, Once  Colace 100 mg oral capsule, 200 mg= 2 cap(s), Oral, Daily  gabapentin 300 mg oral capsule, 300 mg= 1 cap(s), Oral, Once  LATANOPROST 0 005 SOLN, 1 drop, Daily  LISINOPRIL 20 MG TAB, 1 tab, Oral, Daily  METFORMIN 500MG TAB, 1 tab, Oral, BID  NITROGLYCERIN 0 4 MG TAB, 1 tab, SL, q5min, PRN  omeprazole, 20 mg, Oral, Daily, PRN,? ?Still taking, not as prescribed: Takes only prn- OTC Last Dose Date/Time Unknown  Tylenol, 500 mg= 1 tab(s), Oral, q4hr,? ?Not taking: Last Dose Date/Time Unknown  Tylenol, 1000 mg= 2 tab(s), Oral, Once  VENTOLIN HFA 90 MCG INHALER, 2 puff, INH, BID,? ?Not taking: Last Dose Date/Time Unknown Last Dose Date/Time  Unknown  Zofran ODT 4 mg oral tablet, disintegrating, 4 mg= 1 tab(s), Oral, q6hr, PRN, 1 refills  Allergies  No Known Medication Allergies  Social History  Abuse/Neglect  No, No, Yes, 02/04/2020  Alcohol  Never, 10/08/2018  Employment/School  Retired, 05/14/2019  Exercise  Home/Environment  Lives with Spouse., 10/08/2018  Nutrition/Health  Regular, 12/12/2018  Tobacco  Former smoker, quit more than 30 days ago, No, 02/04/2020  Family History  Asthma.: Sister.  Health Maintenance  Health Maintenance  ???Pending?(in the next year)  ??? ??OverDue  ??? ? ? ?Coronary Artery Disease Maintenance-Antiplatelet Agent Prescribed due??and every?  ??? ? ? ?Coronary Artery Disease Maintenance-Lipid Lowering Therapy due??and every?  ??? ? ? ?Advance Directive due??01/01/20??and every 1??year(s)  ??? ? ? ?Geriatric Depression Screening due??01/01/20??and every 1??year(s)  ??? ??Due?  ??? ? ? ?Fall Risk Assessment due??01/01/20??and every 1??year(s)  ??? ? ? ?Functional Assessment due??01/01/20??and every 1??year(s)  ??? ? ? ?ADL Screening due??02/04/20??and every 1??year(s)  ??? ? ? ?Asthma Management-Asthma Education due??02/04/20??and every 6??month(s)  ??? ? ? ?Asthma Management-[place] Peak Flow due??02/04/20??Variable frequency  ??? ? ? ?Asthma Management-Written Action Plan due??02/04/20??and every 6??month(s)  ??? ? ? ?Diabetes Maintenance-Eye Exam due??02/04/20??and every?  ??? ? ? ?Diabetes Maintenance-Foot Exam due??02/04/20??and every?  ??? ? ? ?Diabetes Maintenance-Fasting Lipid Profile due??02/04/20??and every?  ??? ? ? ?Hypertension Management-Education due??02/04/20??and every 1??year(s)  ??? ? ? ?Pneumococcal Vaccine due??02/04/20??Variable frequency  ??? ? ? ?Pneumococcal Vaccine due??02/04/20??and every?  ??? ? ? ?Tetanus Vaccine due??02/04/20??and every 10??year(s)  ??? ? ? ?Zoster Vaccine due??02/04/20??and every 100??year(s)  ??? ??Refused?  ??? ? ? ?Cognitive Screening due??01/01/20??and every 1??year(s)  ???  ??Due In Future?  ??? ? ? ?Diabetes Maintenance-Urine Dipstick not due until??05/14/20??and every 1??year(s)  ??? ? ? ?Coronary Artery Disease Maintenance-BMP not due until??06/05/20??and every 1??year(s)  ??? ? ? ?Diabetes Maintenance-Serum Creatinine not due until??06/05/20??and every 1??year(s)  ??? ? ? ?Aspirin Therapy for CVD Prevention not due until??06/06/20??and every 1??year(s)  ??? ? ? ?Asthma Management-Spirometry not due until??06/06/20??and every 1??year(s)  ??? ? ? ?Coronary Artery Disease Maintenance-Electrocardiogram not due until??06/06/20??and every 1??year(s)  ??? ? ? ?Diabetes Maintenance-HgbA1c not due until??11/12/20??and every 1??year(s)  ??? ? ? ?Hypertension Management-BMP not due until??11/23/20??and every 1??year(s)  ??? ? ? ?Obesity Screening not due until??01/01/21??and every 1??year(s)  ??? ? ? ?Hypertension Management-Blood Pressure not due until??02/03/21??and every 1??year(s)  ???Satisfied?(in the past 1 year)  ??? ??Satisfied?  ??? ? ? ?Aspirin Therapy for CVD Prevention on??06/06/19.??Satisfied by Paz Moya NP  ??? ? ? ?Asthma Management-Asthma Medication Prescribed on??06/04/19.??Satisfied by Paz Moya NP  ??? ? ? ?Asthma Management-Spirometry on??06/06/19.??Satisfied by Gisel Mccall RN  ??? ? ? ?Blood Pressure Screening on??02/04/20.??Satisfied by Miriam Guerin  ??? ? ? ?Body Mass Index Check on??02/04/20.??Satisfied by Truong, Miriam  ??? ? ? ?Coronary Artery Disease Maintenance-Electrocardiogram on??06/06/19.  ??? ? ? ?Coronary Artery Disease Maintenance-Antiplatelet Agent Prescribed on??06/06/19.??Satisfied by Paz Moya NP  ??? ? ? ?Coronary Artery Disease Maintenance-Lipid Lowering Therapy on??06/05/19.??Satisfied by Yung Oshea RN  ??? ? ? ?Coronary Artery Disease Maintenance-BMP on??06/05/19.??Satisfied by Gal Izaguirre  ??? ? ? ?Depression Screening on??05/08/19.??Satisfied by Tonja Barnes  ??? ? ? ?Diabetes Maintenance-HgbA1c  on??05/14/19.??Satisfied by Tea Amador  ??? ? ? ?Diabetes Maintenance-Serum Creatinine on??06/05/19.??Satisfied by Gal Izaguirre  ??? ? ? ?Diabetes Maintenance-Urine Dipstick on??05/14/19.??Satisfied by Gal Izaguirre  ??? ? ? ?Diabetes Screening on??06/05/19.??Satisfied by Gal Izaguirre  ??? ? ? ?Fall Risk Assessment on??06/06/19.??Satisfied by Gisel Mccall RN  ??? ? ? ?Functional Assessment on??06/18/19.??Satisfied by Sobeida Hoffmann LPN  ??? ? ? ?Hypertension Management-BMP on??06/05/19.??Satisfied by Gal Izaguirre  ??? ? ? ?Hypertension Management-Blood Pressure on??02/04/20.??Satisfied by Miriam Guerin  ??? ? ? ?Influenza Vaccine on??02/06/19.??Satisfied by Tonja Barnes  ??? ? ? ?Obesity Screening on??02/04/20.??Satisfied by Miriam Guerin  ??? ??Refused?  ??? ? ? ?Cognitive Screening on??05/14/19.??Recorded by Luis A Whittington  ?  Diagnostic Results  AP lateral right knee reviewed. ?Patients implants appear well fixed with no signs of loosening or subsidence noted.